# Patient Record
Sex: MALE | Race: BLACK OR AFRICAN AMERICAN | Employment: FULL TIME | URBAN - METROPOLITAN AREA
[De-identification: names, ages, dates, MRNs, and addresses within clinical notes are randomized per-mention and may not be internally consistent; named-entity substitution may affect disease eponyms.]

---

## 2019-10-01 ENCOUNTER — HOSPITAL ENCOUNTER (EMERGENCY)
Age: 24
Discharge: HOME | End: 2019-10-01
Attending: EMERGENCY MEDICINE
Payer: MEDICARE

## 2019-10-01 VITALS
BODY MASS INDEX: 29.66 KG/M2 | WEIGHT: 189 LBS | RESPIRATION RATE: 15 BRPM | HEART RATE: 77 BPM | TEMPERATURE: 98.4 F | DIASTOLIC BLOOD PRESSURE: 89 MMHG | OXYGEN SATURATION: 97 % | HEIGHT: 67 IN | SYSTOLIC BLOOD PRESSURE: 127 MMHG

## 2019-10-01 DIAGNOSIS — K52.9 GASTROENTERITIS: Primary | ICD-10-CM

## 2019-10-01 LAB
ABSOLUTE EOS #: 0.2 K/UL (ref 0–0.4)
ABSOLUTE IMMATURE GRANULOCYTE: ABNORMAL K/UL (ref 0–0.3)
ABSOLUTE LYMPH #: 1.3 K/UL (ref 1–4.8)
ABSOLUTE MONO #: 0.7 K/UL (ref 0.1–1.3)
ALBUMIN SERPL-MCNC: 4 G/DL (ref 3.5–5.2)
ALBUMIN/GLOBULIN RATIO: ABNORMAL (ref 1–2.5)
ALP BLD-CCNC: 57 U/L (ref 40–129)
ALT SERPL-CCNC: 24 U/L (ref 5–41)
ANION GAP SERPL CALCULATED.3IONS-SCNC: 13 MMOL/L (ref 9–17)
AST SERPL-CCNC: 21 U/L
BASOPHILS # BLD: 1 % (ref 0–2)
BASOPHILS ABSOLUTE: 0 K/UL (ref 0–0.2)
BILIRUB SERPL-MCNC: 0.83 MG/DL (ref 0.3–1.2)
BUN BLDV-MCNC: 4 MG/DL (ref 6–20)
BUN/CREAT BLD: ABNORMAL (ref 9–20)
CALCIUM SERPL-MCNC: 9.3 MG/DL (ref 8.6–10.4)
CHLORIDE BLD-SCNC: 105 MMOL/L (ref 98–107)
CO2: 23 MMOL/L (ref 20–31)
CREAT SERPL-MCNC: 0.96 MG/DL (ref 0.7–1.2)
DIFFERENTIAL TYPE: ABNORMAL
EOSINOPHILS RELATIVE PERCENT: 4 % (ref 0–4)
GFR AFRICAN AMERICAN: >60 ML/MIN
GFR NON-AFRICAN AMERICAN: >60 ML/MIN
GFR SERPL CREATININE-BSD FRML MDRD: ABNORMAL ML/MIN/{1.73_M2}
GFR SERPL CREATININE-BSD FRML MDRD: ABNORMAL ML/MIN/{1.73_M2}
GLUCOSE BLD-MCNC: 88 MG/DL (ref 70–99)
HCT VFR BLD CALC: 44.9 % (ref 41–53)
HEMOGLOBIN: 14.6 G/DL (ref 13.5–17.5)
IMMATURE GRANULOCYTES: ABNORMAL %
LIPASE: 17 U/L (ref 13–60)
LYMPHOCYTES # BLD: 27 % (ref 24–44)
MCH RBC QN AUTO: 29.4 PG (ref 26–34)
MCHC RBC AUTO-ENTMCNC: 32.6 G/DL (ref 31–37)
MCV RBC AUTO: 90.2 FL (ref 80–100)
MONOCYTES # BLD: 13 % (ref 1–7)
NRBC AUTOMATED: ABNORMAL PER 100 WBC
PDW BLD-RTO: 13 % (ref 11.5–14.9)
PLATELET # BLD: 256 K/UL (ref 150–450)
PLATELET ESTIMATE: ABNORMAL
PMV BLD AUTO: 8.4 FL (ref 6–12)
POTASSIUM SERPL-SCNC: 3.8 MMOL/L (ref 3.7–5.3)
RBC # BLD: 4.98 M/UL (ref 4.5–5.9)
RBC # BLD: ABNORMAL 10*6/UL
SEG NEUTROPHILS: 55 % (ref 36–66)
SEGMENTED NEUTROPHILS ABSOLUTE COUNT: 2.8 K/UL (ref 1.3–9.1)
SODIUM BLD-SCNC: 141 MMOL/L (ref 135–144)
TOTAL PROTEIN: 6.5 G/DL (ref 6.4–8.3)
WBC # BLD: 5 K/UL (ref 3.5–11)
WBC # BLD: ABNORMAL 10*3/UL

## 2019-10-01 PROCEDURE — 99284 EMERGENCY DEPT VISIT MOD MDM: CPT

## 2019-10-01 PROCEDURE — 6370000000 HC RX 637 (ALT 250 FOR IP): Performed by: EMERGENCY MEDICINE

## 2019-10-01 PROCEDURE — 83690 ASSAY OF LIPASE: CPT

## 2019-10-01 PROCEDURE — 6360000002 HC RX W HCPCS: Performed by: EMERGENCY MEDICINE

## 2019-10-01 PROCEDURE — 2500000003 HC RX 250 WO HCPCS: Performed by: EMERGENCY MEDICINE

## 2019-10-01 PROCEDURE — 2580000003 HC RX 258: Performed by: EMERGENCY MEDICINE

## 2019-10-01 PROCEDURE — 96375 TX/PRO/DX INJ NEW DRUG ADDON: CPT

## 2019-10-01 PROCEDURE — 96374 THER/PROPH/DIAG INJ IV PUSH: CPT

## 2019-10-01 PROCEDURE — 80053 COMPREHEN METABOLIC PANEL: CPT

## 2019-10-01 PROCEDURE — 36415 COLL VENOUS BLD VENIPUNCTURE: CPT

## 2019-10-01 PROCEDURE — 85025 COMPLETE CBC W/AUTO DIFF WBC: CPT

## 2019-10-01 RX ORDER — LIDOCAINE HYDROCHLORIDE 20 MG/ML
15 SOLUTION OROPHARYNGEAL ONCE
Status: COMPLETED | OUTPATIENT
Start: 2019-10-01 | End: 2019-10-01

## 2019-10-01 RX ORDER — MAGNESIUM HYDROXIDE/ALUMINUM HYDROXICE/SIMETHICONE 120; 1200; 1200 MG/30ML; MG/30ML; MG/30ML
15 SUSPENSION ORAL ONCE
Status: COMPLETED | OUTPATIENT
Start: 2019-10-01 | End: 2019-10-01

## 2019-10-01 RX ORDER — 0.9 % SODIUM CHLORIDE 0.9 %
1000 INTRAVENOUS SOLUTION INTRAVENOUS ONCE
Status: COMPLETED | OUTPATIENT
Start: 2019-10-01 | End: 2019-10-01

## 2019-10-01 RX ORDER — ONDANSETRON 2 MG/ML
4 INJECTION INTRAMUSCULAR; INTRAVENOUS ONCE
Status: COMPLETED | OUTPATIENT
Start: 2019-10-01 | End: 2019-10-01

## 2019-10-01 RX ORDER — ONDANSETRON 4 MG/1
4 TABLET, ORALLY DISINTEGRATING ORAL EVERY 8 HOURS PRN
Qty: 20 TABLET | Refills: 0 | Status: SHIPPED | OUTPATIENT
Start: 2019-10-01 | End: 2019-10-08

## 2019-10-01 RX ADMIN — ALUMINUM HYDROXIDE, MAGNESIUM HYDROXIDE, AND SIMETHICONE 15 ML: 200; 200; 20 SUSPENSION ORAL at 18:38

## 2019-10-01 RX ADMIN — FAMOTIDINE 20 MG: 10 INJECTION, SOLUTION INTRAVENOUS at 17:05

## 2019-10-01 RX ADMIN — SODIUM CHLORIDE 1000 ML: 9 INJECTION, SOLUTION INTRAVENOUS at 17:05

## 2019-10-01 RX ADMIN — ONDANSETRON 4 MG: 2 INJECTION INTRAMUSCULAR; INTRAVENOUS at 17:05

## 2019-10-01 RX ADMIN — LIDOCAINE HYDROCHLORIDE 15 ML: 20 SOLUTION ORAL; TOPICAL at 18:38

## 2019-10-01 ASSESSMENT — PAIN DESCRIPTION - PAIN TYPE: TYPE: ACUTE PAIN

## 2019-10-01 ASSESSMENT — PAIN SCALES - GENERAL: PAINLEVEL_OUTOF10: 10

## 2019-10-01 ASSESSMENT — PAIN DESCRIPTION - LOCATION: LOCATION: ABDOMEN

## 2019-10-03 ASSESSMENT — ENCOUNTER SYMPTOMS
VOMITING: 1
SORE THROAT: 0
SHORTNESS OF BREATH: 0
EYE PAIN: 0
NAUSEA: 1
DIARRHEA: 1
CONSTIPATION: 0
ABDOMINAL PAIN: 1
COLOR CHANGE: 0
COUGH: 0
BACK PAIN: 0

## 2020-03-06 ENCOUNTER — HOSPITAL ENCOUNTER (EMERGENCY)
Age: 25
Discharge: HOME | End: 2020-03-06
Attending: EMERGENCY MEDICINE
Payer: MEDICARE

## 2020-03-06 VITALS
WEIGHT: 189 LBS | OXYGEN SATURATION: 99 % | TEMPERATURE: 97.7 F | BODY MASS INDEX: 29.66 KG/M2 | SYSTOLIC BLOOD PRESSURE: 156 MMHG | RESPIRATION RATE: 17 BRPM | HEART RATE: 85 BPM | DIASTOLIC BLOOD PRESSURE: 69 MMHG | HEIGHT: 67 IN

## 2020-03-06 LAB
-: ABNORMAL
AMORPHOUS: ABNORMAL
BACTERIA: ABNORMAL
BILIRUBIN URINE: NEGATIVE
CASTS UA: ABNORMAL /LPF (ref 0–8)
COLOR: YELLOW
CRYSTALS, UA: ABNORMAL /HPF
EPITHELIAL CELLS UA: ABNORMAL /HPF (ref 0–5)
GLUCOSE URINE: NEGATIVE
KETONES, URINE: NEGATIVE
LEUKOCYTE ESTERASE, URINE: ABNORMAL
MUCUS: ABNORMAL
NITRITE, URINE: NEGATIVE
OTHER OBSERVATIONS UA: ABNORMAL
PH UA: 8 (ref 5–8)
PROTEIN UA: NEGATIVE
RBC UA: ABNORMAL /HPF (ref 0–4)
RENAL EPITHELIAL, UA: ABNORMAL /HPF
SPECIFIC GRAVITY UA: 1.02 (ref 1–1.03)
TRICHOMONAS: ABNORMAL
TURBIDITY: CLEAR
URINE HGB: NEGATIVE
UROBILINOGEN, URINE: NORMAL
WBC UA: ABNORMAL /HPF (ref 0–5)
YEAST: ABNORMAL

## 2020-03-06 PROCEDURE — 6360000002 HC RX W HCPCS: Performed by: NURSE PRACTITIONER

## 2020-03-06 PROCEDURE — 81001 URINALYSIS AUTO W/SCOPE: CPT

## 2020-03-06 PROCEDURE — 6370000000 HC RX 637 (ALT 250 FOR IP): Performed by: NURSE PRACTITIONER

## 2020-03-06 PROCEDURE — 87529 HSV DNA AMP PROBE: CPT

## 2020-03-06 PROCEDURE — 87491 CHLMYD TRACH DNA AMP PROBE: CPT

## 2020-03-06 PROCEDURE — 87591 N.GONORRHOEAE DNA AMP PROB: CPT

## 2020-03-06 PROCEDURE — 99283 EMERGENCY DEPT VISIT LOW MDM: CPT

## 2020-03-06 PROCEDURE — 96372 THER/PROPH/DIAG INJ SC/IM: CPT

## 2020-03-06 RX ORDER — AZITHROMYCIN 250 MG/1
1000 TABLET, FILM COATED ORAL ONCE
Status: COMPLETED | OUTPATIENT
Start: 2020-03-06 | End: 2020-03-06

## 2020-03-06 RX ORDER — ACYCLOVIR 200 MG/1
400 CAPSULE ORAL 3 TIMES DAILY
Qty: 60 CAPSULE | Refills: 0 | Status: SHIPPED | OUTPATIENT
Start: 2020-03-06 | End: 2020-03-16

## 2020-03-06 RX ORDER — CEFTRIAXONE SODIUM 250 MG/1
250 INJECTION, POWDER, FOR SOLUTION INTRAMUSCULAR; INTRAVENOUS ONCE
Status: COMPLETED | OUTPATIENT
Start: 2020-03-06 | End: 2020-03-06

## 2020-03-06 RX ORDER — ACYCLOVIR 400 MG/1
400 TABLET ORAL ONCE
Status: COMPLETED | OUTPATIENT
Start: 2020-03-06 | End: 2020-03-06

## 2020-03-06 RX ORDER — TRAZODONE HYDROCHLORIDE 50 MG/1
50 TABLET ORAL NIGHTLY
Status: ON HOLD | COMMUNITY
End: 2020-08-03 | Stop reason: HOSPADM

## 2020-03-06 RX ADMIN — ACYCLOVIR 400 MG: 400 TABLET ORAL at 14:56

## 2020-03-06 RX ADMIN — AZITHROMYCIN 1000 MG: 250 TABLET, FILM COATED ORAL at 13:23

## 2020-03-06 RX ADMIN — CEFTRIAXONE SODIUM 250 MG: 250 INJECTION, POWDER, FOR SOLUTION INTRAMUSCULAR; INTRAVENOUS at 13:23

## 2020-03-06 ASSESSMENT — ENCOUNTER SYMPTOMS
ANAL BLEEDING: 0
EYE PAIN: 0
NAUSEA: 0
ABDOMINAL DISTENTION: 0
VOICE CHANGE: 0
VOMITING: 0
CONSTIPATION: 0
RECTAL PAIN: 0
BACK PAIN: 0
SHORTNESS OF BREATH: 0
TROUBLE SWALLOWING: 0
FACIAL SWELLING: 0
CHEST TIGHTNESS: 0
DIARRHEA: 0
ABDOMINAL PAIN: 0

## 2020-03-06 ASSESSMENT — PAIN DESCRIPTION - PAIN TYPE: TYPE: ACUTE PAIN

## 2020-03-06 ASSESSMENT — PAIN DESCRIPTION - LOCATION: LOCATION: PENIS

## 2020-03-06 ASSESSMENT — PAIN SCALES - GENERAL: PAINLEVEL_OUTOF10: 7

## 2020-03-06 NOTE — ED NOTES
Pharmacy called and states they will send Acyclovir down momentarily.      Prema Palm RN  03/06/20 4371

## 2020-03-06 NOTE — ED PROVIDER NOTES
pain, frequency, hematuria, penile swelling, scrotal swelling, testicular pain and urgency. Musculoskeletal: Negative for arthralgias, back pain, myalgias, neck pain and neck stiffness. Skin: Negative for rash and wound. Neurological: Negative for headaches. Hematological: Negative for adenopathy. Psychiatric/Behavioral: Negative for confusion. PHYSICAL EXAM  (up to 7 for level 4, 8 or more for level 5)      INITIAL VITALS:  height is 5' 7\" (1.702 m) and weight is 189 lb (85.7 kg). His oral temperature is 97.7 °F (36.5 °C). His blood pressure is 156/69 (abnormal) and his pulse is 85. His respiration is 17 and oxygen saturation is 99%. Physical Exam  Vitals signs and nursing note reviewed. Exam conducted with a chaperone present. Constitutional:       General: He is not in acute distress. Appearance: Normal appearance. He is well-developed. He is not ill-appearing or diaphoretic. HENT:      Head: Normocephalic and atraumatic. Eyes:      Conjunctiva/sclera: Conjunctivae normal.   Neck:      Musculoskeletal: Full passive range of motion without pain and normal range of motion. Trachea: Trachea normal. No tracheal deviation. Cardiovascular:      Rate and Rhythm: Normal rate. Pulses: Normal pulses. Pulmonary:      Effort: Pulmonary effort is normal. No respiratory distress. Breath sounds: Normal breath sounds. Abdominal:      General: Abdomen is flat. Bowel sounds are normal. There is no distension. Palpations: Abdomen is soft. Abdomen is not rigid. There is no mass. Tenderness: There is no abdominal tenderness. There is no right CVA tenderness, left CVA tenderness, guarding or rebound. Negative signs include Moore's sign and McBurney's sign. Genitourinary:     Penis: Circumcised. Lesions present. No phimosis, paraphimosis, hypospadias, erythema, tenderness or discharge. Scrotum/Testes: Normal.         Right: Tenderness or swelling not present. Left: Tenderness or swelling not present. Comments: Multiple vesicles superior to the glans of the penis on the shaft. At this time they are all closed and there is no drainage. Tenderness with touch. No erythema or swelling. Musculoskeletal: Normal range of motion. Lymphadenopathy:      Lower Body: No right inguinal adenopathy. No left inguinal adenopathy. Skin:     General: Skin is warm and dry. Capillary Refill: Capillary refill takes less than 2 seconds. Findings: No bruising or ecchymosis. Neurological:      Mental Status: He is alert, oriented to person, place, and time and easily aroused. Sensory: No sensory deficit. Psychiatric:         Behavior: Behavior normal. Behavior is cooperative. Thought Content: Thought content normal.         Judgment: Judgment normal.       PLAN (LABS / IMAGING / EKG):  Orders Placed This Encounter   Procedures    C.trachomatis N.gonorrhoeae DNA, Urine    Urinalysis with microscopic    Herpes Simplex 1 & 2, Molecular       MEDICATIONS ORDERED:  Orders Placed This Encounter   Medications    azithromycin (ZITHROMAX) tablet 1,000 mg    cefTRIAXone (ROCEPHIN) injection 250 mg    acyclovir (ZOVIRAX) tablet 400 mg    acyclovir (ZOVIRAX) 200 MG capsule     Sig: Take 2 capsules by mouth 3 times daily for 10 days     Dispense:  60 capsule     Refill:  0       Controlled Substances Monitoring:       Differential Diagnoses:  Phimosis, paraphimosis, priapism, balanitis, posthitis, spermatocele, hydrocele, varicocele, epididymitis, orchitis, prostatitis, testicular torsion, testicular cancer, indirect inguinal hernia, alireza's gangrene, sexually transmitted infections. DIAGNOSTIC RESULTS / EMERGENCY DEPARTMENT COURSE / MDM     Patient here today with request for STD check. Patient states that he woke up this morning and noticed multiple little bumps on the shaft of his penis.   He denies any history of herpes and states he has never had anything like this in the past.  He states that he noted some because they were slightly painful. He denies any drainage or open wounds. He states he would like to be checked for STDs and treated at this time as well. We explained that we will not have his results for chlamydia and gonorrhea today but he can get those within the next 48 hours with my chart or medical records. Patient would like to be treated with azithromycin and Rocephin at this time for chlamydia and gonorrhea as well. On assessment the patient does have multiple vesicles on the shaft of his penis. A culture was obtained by opening 1 of the vesicles and obtaining fluid and sending this to lab. Patient given first dose of acyclovir here in the ER. Given prescription for this as well for the next 10 days and told to follow-up with primary care walk-in clinic. Patient was also instructed to avoid intercourse while the breakout is occurring. Told to take his prescription as indicated. He was also told to have any partners treated as well at this time. Please return to ER with any worsening of symptoms. RADIOLOGY:   I directly visualized (with the attending physician) the following  imagesand reviewed the radiologist interpretations:  No results found.     No orders to display       LABS:  Results for orders placed or performed during the hospital encounter of 03/06/20   Urinalysis with microscopic   Result Value Ref Range    Color, UA YELLOW YELLOW    Turbidity UA CLEAR CLEAR    Glucose, Ur NEGATIVE NEGATIVE    Bilirubin Urine NEGATIVE NEGATIVE    Ketones, Urine NEGATIVE NEGATIVE    Specific Gravity, UA 1.017 1.005 - 1.030    Urine Hgb NEGATIVE NEGATIVE    pH, UA 8.0 5.0 - 8.0    Protein, UA NEGATIVE NEGATIVE    Urobilinogen, Urine Normal Normal    Nitrite, Urine NEGATIVE NEGATIVE    Leukocyte Esterase, Urine TRACE (A) NEGATIVE    -          WBC, UA 10 TO 20 0 - 5 /HPF    RBC, UA None 0 - 4 /HPF    Casts UA  0 - 8 /LPF     0 TO 2 HYALINE

## 2020-03-07 LAB
HSV 1, NAAT: POSITIVE
HSV 2, NAAT: POSITIVE
SPECIMEN DESCRIPTION: ABNORMAL

## 2020-03-09 LAB
C. TRACHOMATIS DNA ,URINE: ABNORMAL
N. GONORRHOEAE DNA, URINE: NEGATIVE
SPECIMEN DESCRIPTION: ABNORMAL

## 2020-03-10 ENCOUNTER — TELEPHONE (OUTPATIENT)
Dept: PHARMACY | Age: 25
End: 2020-03-10

## 2020-05-25 ENCOUNTER — HOSPITAL ENCOUNTER (EMERGENCY)
Age: 25
Discharge: HOME | End: 2020-05-25
Attending: EMERGENCY MEDICINE
Payer: MEDICARE

## 2020-05-25 VITALS
HEIGHT: 67 IN | WEIGHT: 195 LBS | SYSTOLIC BLOOD PRESSURE: 136 MMHG | DIASTOLIC BLOOD PRESSURE: 83 MMHG | BODY MASS INDEX: 30.61 KG/M2 | TEMPERATURE: 98.1 F | OXYGEN SATURATION: 99 % | HEART RATE: 67 BPM | RESPIRATION RATE: 16 BRPM

## 2020-05-25 PROCEDURE — 99282 EMERGENCY DEPT VISIT SF MDM: CPT

## 2020-05-25 RX ORDER — ACYCLOVIR 400 MG/1
200 TABLET ORAL
Qty: 25 TABLET | Refills: 0 | Status: SHIPPED | OUTPATIENT
Start: 2020-05-25 | End: 2020-06-04

## 2020-05-25 ASSESSMENT — ENCOUNTER SYMPTOMS
GASTROINTESTINAL NEGATIVE: 1
RESPIRATORY NEGATIVE: 1

## 2020-05-25 ASSESSMENT — PAIN DESCRIPTION - FREQUENCY: FREQUENCY: CONTINUOUS

## 2020-05-25 ASSESSMENT — PAIN DESCRIPTION - DESCRIPTORS: DESCRIPTORS: SORE

## 2020-05-25 ASSESSMENT — PAIN DESCRIPTION - ORIENTATION: ORIENTATION: RIGHT;LEFT

## 2020-05-25 ASSESSMENT — PAIN DESCRIPTION - PAIN TYPE: TYPE: ACUTE PAIN

## 2020-05-25 ASSESSMENT — PAIN DESCRIPTION - LOCATION: LOCATION: GROIN

## 2020-05-25 ASSESSMENT — PAIN SCALES - GENERAL: PAINLEVEL_OUTOF10: 9

## 2020-05-25 NOTE — ED PROVIDER NOTES
RESULTS / EMERGENCY DEPARTMENT COURSE / [facility]     LABS:  No results found for this visit on 05/25/20. RADIOLOGY:  None    EKG  None    All EKG's are interpreted by the Emergency Department Physician who either signs or Co-signs this chart in the absence of a cardiologist.    EMERGENCY DEPARTMENT COURSE:  30 y.o. male who presents with HSV infx requesting antiviral.                  Patient was counseled to follow up with their Primary Care Provider as soon as possible for an ER follow-up visit. Patient counseled to return to the Emergency Department for any worsening symptoms, unresolved symptoms, or for any other cares or concerns. Patient counseled on the use of alternating Motrin and Tylenol should they develop a fever at home. Patient verbalized understanding and agreement with plan. Discharged to home in stable condition and in no distress. PROCEDURES:  None    CONSULTS:  None    CRITICAL CARE:  None    FINAL IMPRESSION      1. HSV (herpes simplex virus) infection              DISPOSITION / PLAN     DISPOSITION Decision To Discharge 05/25/2020 04:30:51 PM      PATIENT REFERRED TO:  No follow-up provider specified.     DISCHARGE MEDICATIONS:  New Prescriptions    ACYCLOVIR (ZOVIRAX) 400 MG TABLET    Take 0.5 tablets by mouth 5 times daily for 10 days       Martin Ordonez DO  Emergency Medicine Resident    (Please note that portions of thisnote were completed with a voice recognition program.  Efforts were made to edit the dictations but occasionally words are mis-transcribed.)       Martin Ordonez DO  Resident  05/25/20 8964

## 2020-07-29 ENCOUNTER — HOSPITAL ENCOUNTER (EMERGENCY)
Age: 25
Discharge: PSYCHIATRIC OR SUBSTANCE USE FACILITY | End: 2020-07-30
Attending: EMERGENCY MEDICINE
Payer: MEDICARE

## 2020-07-29 ENCOUNTER — APPOINTMENT (OUTPATIENT)
Dept: GENERAL RADIOLOGY | Age: 25
End: 2020-07-29
Payer: MEDICARE

## 2020-07-29 VITALS
BODY MASS INDEX: 30.29 KG/M2 | TEMPERATURE: 97.5 F | SYSTOLIC BLOOD PRESSURE: 150 MMHG | RESPIRATION RATE: 18 BRPM | WEIGHT: 193 LBS | HEIGHT: 67 IN | DIASTOLIC BLOOD PRESSURE: 96 MMHG | OXYGEN SATURATION: 98 % | HEART RATE: 77 BPM

## 2020-07-29 LAB
-: NORMAL
ABSOLUTE EOS #: 0.05 K/UL (ref 0–0.44)
ABSOLUTE IMMATURE GRANULOCYTE: 0.03 K/UL (ref 0–0.3)
ABSOLUTE LYMPH #: 1.41 K/UL (ref 1.1–3.7)
ABSOLUTE MONO #: 0.39 K/UL (ref 0.1–1.2)
ACETAMINOPHEN LEVEL: <5 UG/ML (ref 10–30)
ALBUMIN SERPL-MCNC: 4.1 G/DL (ref 3.5–5.2)
ALBUMIN/GLOBULIN RATIO: 2 (ref 1–2.5)
ALP BLD-CCNC: 44 U/L (ref 40–129)
ALT SERPL-CCNC: 30 U/L (ref 5–41)
AMORPHOUS: NORMAL
AMPHETAMINE SCREEN URINE: NEGATIVE
ANION GAP SERPL CALCULATED.3IONS-SCNC: 13 MMOL/L (ref 9–17)
AST SERPL-CCNC: 27 U/L
BACTERIA: NORMAL
BARBITURATE SCREEN URINE: NEGATIVE
BASOPHILS # BLD: 1 % (ref 0–2)
BASOPHILS ABSOLUTE: 0.03 K/UL (ref 0–0.2)
BENZODIAZEPINE SCREEN, URINE: NEGATIVE
BILIRUB SERPL-MCNC: 0.72 MG/DL (ref 0.3–1.2)
BILIRUBIN DIRECT: 0.18 MG/DL
BILIRUBIN URINE: NEGATIVE
BILIRUBIN, INDIRECT: 0.54 MG/DL (ref 0–1)
BUN BLDV-MCNC: 8 MG/DL (ref 6–20)
BUN/CREAT BLD: NORMAL (ref 9–20)
BUPRENORPHINE URINE: ABNORMAL
CALCIUM SERPL-MCNC: 9.2 MG/DL (ref 8.6–10.4)
CANNABINOID SCREEN URINE: POSITIVE
CASTS UA: NORMAL /LPF (ref 0–8)
CHLORIDE BLD-SCNC: 104 MMOL/L (ref 98–107)
CO2: 22 MMOL/L (ref 20–31)
COCAINE METABOLITE, URINE: NEGATIVE
COLOR: YELLOW
COMMENT UA: ABNORMAL
CREAT SERPL-MCNC: 0.93 MG/DL (ref 0.7–1.2)
CRYSTALS, UA: NORMAL /HPF
DIFFERENTIAL TYPE: ABNORMAL
EOSINOPHILS RELATIVE PERCENT: 1 % (ref 1–4)
EPITHELIAL CELLS UA: NORMAL /HPF (ref 0–5)
ETHANOL PERCENT: <0.01 %
ETHANOL: <10 MG/DL
GFR AFRICAN AMERICAN: >60 ML/MIN
GFR NON-AFRICAN AMERICAN: >60 ML/MIN
GFR SERPL CREATININE-BSD FRML MDRD: NORMAL ML/MIN/{1.73_M2}
GFR SERPL CREATININE-BSD FRML MDRD: NORMAL ML/MIN/{1.73_M2}
GLOBULIN: ABNORMAL G/DL (ref 1.5–3.8)
GLUCOSE BLD-MCNC: 87 MG/DL (ref 70–99)
GLUCOSE URINE: NEGATIVE
HCT VFR BLD CALC: 44.7 % (ref 40.7–50.3)
HEMOGLOBIN: 14.4 G/DL (ref 13–17)
IMMATURE GRANULOCYTES: 1 %
KETONES, URINE: NEGATIVE
LACTIC ACID, WHOLE BLOOD: 1.7 MMOL/L (ref 0.7–2.1)
LEUKOCYTE ESTERASE, URINE: ABNORMAL
LIPASE: 20 U/L (ref 13–60)
LYMPHOCYTES # BLD: 26 % (ref 24–43)
MCH RBC QN AUTO: 30.2 PG (ref 25.2–33.5)
MCHC RBC AUTO-ENTMCNC: 32.2 G/DL (ref 28.4–34.8)
MCV RBC AUTO: 93.7 FL (ref 82.6–102.9)
MDMA URINE: ABNORMAL
METHADONE SCREEN, URINE: NEGATIVE
METHAMPHETAMINE, URINE: ABNORMAL
MONOCYTES # BLD: 7 % (ref 3–12)
MUCUS: NORMAL
NITRITE, URINE: NEGATIVE
NRBC AUTOMATED: 0 PER 100 WBC
OPIATES, URINE: NEGATIVE
OTHER OBSERVATIONS UA: NORMAL
OXYCODONE SCREEN URINE: NEGATIVE
PDW BLD-RTO: 12.9 % (ref 11.8–14.4)
PH UA: >9 (ref 5–8)
PHENCYCLIDINE, URINE: NEGATIVE
PLATELET # BLD: 259 K/UL (ref 138–453)
PLATELET ESTIMATE: ABNORMAL
PMV BLD AUTO: 9.9 FL (ref 8.1–13.5)
POTASSIUM SERPL-SCNC: 4.1 MMOL/L (ref 3.7–5.3)
PROPOXYPHENE, URINE: ABNORMAL
PROTEIN UA: NEGATIVE
RBC # BLD: 4.77 M/UL (ref 4.21–5.77)
RBC # BLD: ABNORMAL 10*6/UL
RBC UA: NORMAL /HPF (ref 0–4)
RENAL EPITHELIAL, UA: NORMAL /HPF
SALICYLATE LEVEL: <1 MG/DL (ref 3–10)
SEG NEUTROPHILS: 64 % (ref 36–65)
SEGMENTED NEUTROPHILS ABSOLUTE COUNT: 3.57 K/UL (ref 1.5–8.1)
SODIUM BLD-SCNC: 139 MMOL/L (ref 135–144)
SPECIFIC GRAVITY UA: 1.02 (ref 1–1.03)
TEST INFORMATION: ABNORMAL
TOTAL PROTEIN: 6.2 G/DL (ref 6.4–8.3)
TOXIC TRICYCLIC SC,BLOOD: NEGATIVE
TRICHOMONAS: NORMAL
TRICYCLIC ANTIDEPRESSANTS, UR: ABNORMAL
TURBIDITY: CLEAR
URINE HGB: NEGATIVE
UROBILINOGEN, URINE: NORMAL
WBC # BLD: 5.5 K/UL (ref 3.5–11.3)
WBC # BLD: ABNORMAL 10*3/UL
WBC UA: NORMAL /HPF (ref 0–5)
YEAST: NORMAL

## 2020-07-29 PROCEDURE — 83690 ASSAY OF LIPASE: CPT

## 2020-07-29 PROCEDURE — 99285 EMERGENCY DEPT VISIT HI MDM: CPT

## 2020-07-29 PROCEDURE — 80307 DRUG TEST PRSMV CHEM ANLYZR: CPT

## 2020-07-29 PROCEDURE — 80076 HEPATIC FUNCTION PANEL: CPT

## 2020-07-29 PROCEDURE — 81001 URINALYSIS AUTO W/SCOPE: CPT

## 2020-07-29 PROCEDURE — 93005 ELECTROCARDIOGRAM TRACING: CPT | Performed by: STUDENT IN AN ORGANIZED HEALTH CARE EDUCATION/TRAINING PROGRAM

## 2020-07-29 PROCEDURE — 83605 ASSAY OF LACTIC ACID: CPT

## 2020-07-29 PROCEDURE — G0480 DRUG TEST DEF 1-7 CLASSES: HCPCS

## 2020-07-29 PROCEDURE — 6370000000 HC RX 637 (ALT 250 FOR IP): Performed by: STUDENT IN AN ORGANIZED HEALTH CARE EDUCATION/TRAINING PROGRAM

## 2020-07-29 PROCEDURE — 80048 BASIC METABOLIC PNL TOTAL CA: CPT

## 2020-07-29 PROCEDURE — 71045 X-RAY EXAM CHEST 1 VIEW: CPT

## 2020-07-29 PROCEDURE — 85025 COMPLETE CBC W/AUTO DIFF WBC: CPT

## 2020-07-29 RX ORDER — FAMOTIDINE 20 MG/1
20 TABLET, FILM COATED ORAL ONCE
Status: COMPLETED | OUTPATIENT
Start: 2020-07-29 | End: 2020-07-29

## 2020-07-29 RX ADMIN — FAMOTIDINE 20 MG: 20 TABLET, FILM COATED ORAL at 17:19

## 2020-07-29 ASSESSMENT — PAIN DESCRIPTION - ORIENTATION: ORIENTATION: MID;LOWER;UPPER

## 2020-07-29 ASSESSMENT — PAIN DESCRIPTION - LOCATION: LOCATION: ABDOMEN

## 2020-07-29 ASSESSMENT — PAIN DESCRIPTION - PAIN TYPE: TYPE: ACUTE PAIN

## 2020-07-29 ASSESSMENT — PAIN DESCRIPTION - PROGRESSION: CLINICAL_PROGRESSION: NOT CHANGED

## 2020-07-29 ASSESSMENT — PAIN DESCRIPTION - DESCRIPTORS: DESCRIPTORS: ACHING;PRESSURE;SHARP;DISCOMFORT

## 2020-07-29 ASSESSMENT — PAIN DESCRIPTION - FREQUENCY: FREQUENCY: CONTINUOUS

## 2020-07-29 ASSESSMENT — PAIN SCALES - GENERAL: PAINLEVEL_OUTOF10: 10

## 2020-07-29 NOTE — ED NOTES
Dr Lester Padilla accepted patient to [facility], dx schizoaffective disorder. Dr Lester Padilla requested urine drug screen, writer to inform RN. Writer to coordinate transfer & arrange transport.       JORGE Whitlock, [place]  07/29/20 8890

## 2020-07-29 NOTE — ED PROVIDER NOTES
[address]  ED  Emergency Department Encounter  Emergency Medicine Resident     Pt Name: Bertha Sheikh  MRN: [id number]  [place] 1995  Date of evaluation: 7/29/20  PCP:  No primary care provider on file. CHIEF COMPLAINT       Chief Complaint   Patient presents with    Suicidal     Suicidal thoughts & Homicidal thoughts. Has Voices in head        HISTORY OFPRESENT ILLNESS  (Location/Symptom, Timing/Onset, Context/Setting, Quality, Duration, Modifying Factors,Severity.)      Bertha Sheikh is a 30 y.o. male who presents with suicidal ideation and thoughts. Patient states he is hearing voices in his head telling him to hurt others or hurt himself. Patient states that he has felt like this before and was admitted to inpatient facility where he was placed on medications. Patient states he given medications to sleep, trazodone however he was also prescribed Risperdal but he does not like to take this as it sedates him. Patient is an inadequate historian, he does not remember where he slept last night he states he keeps blacking out, he does admit to intermittent alcohol use. Patient states he has abdominal pain. Patient denies any fever, chills, nausea, vomiting, chest pain, shortness of breath. Patient states he used to live with family, however he stays with people now, he does not have any specifics regarding questions. Patient states he has heard the diagnosis of schizophrenia previously. PAST MEDICAL / SURGICAL / SOCIAL / FAMILY HISTORY      has no past medical history on file. has no past surgical history on file.      Social History     Socioeconomic History    Marital status: Single     Spouse name: Not on file    Number of children: Not on file    Years of education: Not on file    Highest education level: Not on file   Occupational History    Not on file   Social Needs    Financial resource strain: Not on file    Food insecurity     Worry: Not on file Inability: Not on file    Transportation needs     Medical: Not on file     Non-medical: Not on file   Tobacco Use    Smoking status: Former Smoker    Smokeless tobacco: Never Used   Substance and Sexual Activity    Alcohol use: Not Currently    Drug use: Yes     Types: Marijuana     Comment: occasional marijuana use    Sexual activity: Yes     Partners: Female   Lifestyle    Physical activity     Days per week: Not on file     Minutes per session: Not on file    Stress: Not on file   Relationships    Social connections     Talks on phone: Not on file     Gets together: Not on file     Attends Rastafari service: Not on file     Active member of club or organization: Not on file     Attends meetings of clubs or organizations: Not on file     Relationship status: Not on file    Intimate partner violence     Fear of current or ex partner: Not on file     Emotionally abused: Not on file     Physically abused: Not on file     Forced sexual activity: Not on file   Other Topics Concern    Not on file   Social History Narrative    Not on file       No family history on file. Allergies:  Patient has no known allergies. Home Medications:  Prior to Admission medications    Medication Sig Start Date End Date Taking?  Authorizing Provider   traZODone (DESYREL) 50 MG tablet Take 50 mg by mouth nightly    Historical Provider, MD       REVIEW OFSYSTEMS    (2-9 systems for level 4, 10 or more for level 5)      Constitutional ROS - No recent fevers, No recent chills, +SI, HI   Neurological ROS - No Headache, No Syncope  Opthalmologic ROS- No eye pain, No vision changes   ENT ROS - No sore throat, No congestion  Respiratory ROS - No cough, No shortness of breath  Cardiovascular ROS - No chest pain, No palpitations   Gastrointestinal ROS - No abdominal pain, No nausea, No vomiting  Genito-Urinary ROS - No dysuria, Nohematuria  Musculoskeletal ROS - No back pain, No neck pain  Dermatological ROS - No wound, No rash  PHYSICAL EXAM   (up to 7 for level 4, 8 or more forlevel 5)      INITIAL VITALS:   ED Triage Vitals [07/29/20 1407]   BP Temp Temp Source Pulse Resp SpO2 Height Weight   (!) 150/96 97.5 °F (36.4 °C) Oral 77 18 98 % 5' 7\" (1.702 m) 193 lb (87.5 kg)       Physical Exam  HENT:      Head: Normocephalic and atraumatic. Eyes:      Pupils: Pupils are equal, round, and reactive to light. Neck:      Musculoskeletal: Normal range of motion and neck supple. Cardiovascular:      Rate and Rhythm: Normal rate and regular rhythm. Pulmonary:      Effort: Pulmonary effort is normal. No respiratory distress. Breath sounds: Normal breath sounds. No stridor. Abdominal:      General: Bowel sounds are normal. There is no distension. Palpations: Abdomen is soft. Tenderness: There is no abdominal tenderness. Musculoskeletal: Normal range of motion. General: No deformity. Skin:     General: Skin is warm and dry. Capillary Refill: Capillary refill takes less than 2 seconds. Neurological:      Mental Status: He is alert and oriented to person, place, and time. Psychiatric:         Behavior: Behavior normal.         DIFFERENTIAL  DIAGNOSIS     PLAN (LABS / IMAGING / EKG):  Orders Placed This Encounter   Procedures    XR CHEST PORTABLE    CBC Auto Differential    Basic Metabolic Panel    LIPASE    Urine Drug Screen    Urinalysis Reflex to Culture    Lactic Acid, Whole Blood    Hepatic Function Panel    TOX SCR, BLD, ED    Microscopic Urinalysis    Inpatient consult to Social Work    EKG 12 Lead       MEDICATIONS ORDERED:  Orders Placed This Encounter   Medications    famotidine (PEPCID) tablet 20 mg       DDX: Suicidal ideation, homicidal ideation, toxic substance abuse, substance abuse, toxic ingestion, pancreatitis, intra-abdominal pathology, schizophrenia, undiagnosed Polar disorder,    Initial MDM/Plan: 30 y.o. male who presents with suicidal and homicidal ideation.   Patient All other components within normal limits   TOX SCR, BLD, ED - Abnormal; Notable for the following components:    Acetaminophen Level <5 (*)     Salicylate Lvl <1 (*)     All other components within normal limits   BASIC METABOLIC PANEL   LIPASE   LACTIC ACID, WHOLE BLOOD   MICROSCOPIC URINALYSIS         RADIOLOGY:  Xr Chest Portable    Result Date: 7/29/2020  EXAMINATION: ONE XRAY VIEW OF THE CHEST 7/29/2020 3:09 pm COMPARISON: Chest x-ray, 03/19/2011 HISTORY: ORDERING SYSTEM PROVIDED HISTORY: abdominal brionna TECHNOLOGIST PROVIDED HISTORY: abdominal brionna FINDINGS: The cardiomediastinal contours are unremarkable given the portable AP technique. The lungs are clear bilaterally. There is no evidence of focal consolidation, pulmonary edema, pleural effusion or pneumothorax. Stable chest with no acute cardiopulmonary process. EKG  EKG Interpretation    Interpreted by me    Rhythm: normal sinus   Rate: normal  Axis: normal  Ectopy: none  Conduction: normal  ST Segments: no acute change  T Waves: no acute change  Q Waves: none    Clinical Impression: no acute changes and normal EKG    All EKG's are interpreted by the Emergency Department Physicianwho either signs or Co-signs this chart in the absence of a cardiologist.      EMERGENCY DEPARTMENT COURSE:  ED Course as of Jul 29 1930 Wed Jul 29, 2020   1712 Patient is reporting acid reflux, will prescribe patient Pepcid. [GABINO]      ED Course User Index  [GABINO] Pushpa Lindsay DO           PROCEDURES:  None    CONSULTS:  IP CONSULT TO SOCIAL WORK    CRITICAL CARE:  Please see attending note    FINAL IMPRESSION      1. Suicidal ideation          DISPOSITION / PLAN     DISPOSITION        PATIENT REFERRED TO:  No follow-up provider specified.     DISCHARGE MEDICATIONS:  New Prescriptions    No medications on file       Niant[place] Signs, DO  Emergency Medicine Resident    (Please note that portions of this note were completed with a voice recognition program.Efforts were made to edit the dictations but occasionally words are mis-transcribed.)       Chris Perdomo DO  Resident  07/31/20 2012

## 2020-07-29 NOTE — ED NOTES
Pt provided lunch box. Resting in bed. NAD at this time. Even non labored RR. Will continue to monitor.         Christen Centeno RN  07/29/20 9348

## 2020-07-29 NOTE — ED NOTES
Pt complaining of acid reflux. Notified Dr. Alma Delia Lord. NAD at this time. Even non labored RR. Will continue to monitor.         Mali Nevarez RN  07/29/20 8224

## 2020-07-29 NOTE — ED PROVIDER NOTES
[address] ED  Emergency Department  Faculty Sign-Out Addendum     Care of Karin Salmon was assumed from previous attending and is being seen for Suicidal (Suicidal thoughts & Homicidal thoughts. Has Voices in head )  . The patient's initial evaluation and plan have been discussed with the prior provider who initially evaluated the patient. Attestation    I was available and discussed any additional care issues that arose and coordinated the management plans with the resident(s) caring for the patient during my duty period. Any areas of disagreement with residents documentation of care or procedures are noted on the chart. I was personally present for the key portions of any/all procedures during my duty period. I have documented in the chart those procedures where I was not present during the key portions. EMERGENCY DEPARTMENT COURSE / MEDICAL DECISION MAKING:       MEDICATIONS GIVEN:  Orders Placed This Encounter   Medications    famotidine (PEPCID) tablet 20 mg       LABS / RADIOLOGY:     Labs Reviewed   CBC WITH AUTO DIFFERENTIAL - Abnormal; Notable for the following components:       Result Value    Immature Granulocytes 1 (*)     All other components within normal limits   HEPATIC FUNCTION PANEL - Abnormal; Notable for the following components:     Total Protein 6.2 (*)     All other components within normal limits   TOX SCR, BLD, ED - Abnormal; Notable for the following components:    Acetaminophen Level <5 (*)     Salicylate Lvl <1 (*)     All other components within normal limits   BASIC METABOLIC PANEL   LIPASE   LACTIC ACID, WHOLE BLOOD   URINE DRUG SCREEN   URINE RT REFLEX TO CULTURE       Xr Chest Portable    Result Date: 7/29/2020  EXAMINATION: ONE XRAY VIEW OF THE CHEST 7/29/2020 3:09 pm COMPARISON: Chest x-ray, 03/19/2011 HISTORY: ORDERING SYSTEM PROVIDED HISTORY: abdominal brionna TECHNOLOGIST PROVIDED HISTORY: abdominal brionna FINDINGS: The cardiomediastinal contours are unremarkable given the portable AP technique. The lungs are clear bilaterally. There is no evidence of focal consolidation, pulmonary edema, pleural effusion or pneumothorax. Stable chest with no acute cardiopulmonary process. RECENT VITALS:     Temp: 97.5 °F (36.4 °C),  Pulse: 77, Resp: 18, BP: (!) 150/96, SpO2: 98 %    Joe Panchal is a 30 y.o. male who presents with complaint of suicidal and homicidal ideation. Medically stable for psychiatric evaluation. Awaiting transfer. OUTSTANDING TASKS / RECOMMENDATIONS:    1.  Disposition made by previous attending and nothing to do      Treesita Cottrell MD, [facility]  Attending Emergency Physician  [facility] ED       Johnny Black MD  07/29/20 3532

## 2020-07-29 NOTE — ED PROVIDER NOTES
[facility] ED  eMERGENCY dEPARTMENT eNCOUnter   Attending Attestation     Pt Name: Hu Covarrubias  MRN: [id number]  [place] 1995  Date of evaluation: 7/29/20       Hu Covarrubias is a 30 y.o. male who presents with Suicidal (Suicidal thoughts & Homicidal thoughts. Has Voices in head )      History: Patient with suicidal and homicidal thoughts with auditory hallucinations as well as epigastric pain. Patient has no other complaints. Exam: Heart rate rhythm regular. Lungs are clear to auscultation bilaterally. Abdomen soft, nontender. Patient is well-appearing. Patient endorses homicidal and suicidal thoughts. And for social work involvement and probable transfer to [facility]. I performed a history and physical examination of the patient and discussed management with the resident. I reviewed the residents note and agree with the documented findings and plan of care. Any areas of disagreement are noted on the chart. I was personally present for the key portions of any procedures. I have documented in the chart those procedures where I was not present during the key portions. I have personally reviewed all images and agree with the resident's interpretation. I have reviewed the emergency nurses triage note. I agree with the chief complaint, past medical history, past surgical history, allergies, medications, social and family history as documented unless otherwise noted below. Documentation of the HPI, Physical Exam and Medical Decision Making performed by medical students or scribes is based on my personal performance of the HPI, PE and MDM. For Phys Assistant/ Nurse Practitioner cases/documentation I have had a face to face evaluation of this patient and have completed at least one if not all key elements of the E/M (history, physical exam, and MDM). Additional findings are as noted.     For APC cases I have personally evaluated and examined the patient in conjunction with the [facility] and agree with the treatment plan and disposition of the patient as recorded by the APC.     Luigi Katz MD  Attending Emergency  Physician        Sanjana Khalil MD  07/29/20 2829

## 2020-07-30 ENCOUNTER — HOSPITAL ENCOUNTER (INPATIENT)
Age: 25
LOS: 4 days | Discharge: HOME | DRG: 750 | End: 2020-08-03
Attending: PSYCHIATRY & NEUROLOGY | Admitting: PSYCHIATRY & NEUROLOGY
Payer: MEDICARE

## 2020-07-30 PROBLEM — F20.0 PARANOID SCHIZOPHRENIA (HCC): Status: ACTIVE | Noted: 2020-07-30

## 2020-07-30 LAB
EKG ATRIAL RATE: 78 BPM
EKG P AXIS: 67 DEGREES
EKG P-R INTERVAL: 144 MS
EKG Q-T INTERVAL: 376 MS
EKG QRS DURATION: 86 MS
EKG QTC CALCULATION (BAZETT): 428 MS
EKG R AXIS: 41 DEGREES
EKG T AXIS: 37 DEGREES
EKG VENTRICULAR RATE: 78 BPM
SARS-COV-2, PCR: NORMAL
SARS-COV-2, RAPID: NORMAL
SARS-COV-2: NOT DETECTED
SOURCE: NORMAL

## 2020-07-30 PROCEDURE — 6370000000 HC RX 637 (ALT 250 FOR IP): Performed by: REGISTERED NURSE

## 2020-07-30 PROCEDURE — 1240000000 HC EMOTIONAL WELLNESS R&B

## 2020-07-30 PROCEDURE — 93010 ELECTROCARDIOGRAM REPORT: CPT | Performed by: INTERNAL MEDICINE

## 2020-07-30 PROCEDURE — 90792 PSYCH DIAG EVAL W/MED SRVCS: CPT | Performed by: REGISTERED NURSE

## 2020-07-30 PROCEDURE — U0003 INFECTIOUS AGENT DETECTION BY NUCLEIC ACID (DNA OR RNA); SEVERE ACUTE RESPIRATORY SYNDROME CORONAVIRUS 2 (SARS-COV-2) (CORONAVIRUS DISEASE [COVID-19]), AMPLIFIED PROBE TECHNIQUE, MAKING USE OF HIGH THROUGHPUT TECHNOLOGIES AS DESCRIBED BY CMS-2020-01-R: HCPCS

## 2020-07-30 RX ORDER — QUETIAPINE FUMARATE 100 MG/1
100 TABLET, FILM COATED ORAL NIGHTLY
Status: DISCONTINUED | OUTPATIENT
Start: 2020-07-30 | End: 2020-07-31

## 2020-07-30 RX ORDER — ACETAMINOPHEN 325 MG/1
650 TABLET ORAL EVERY 4 HOURS PRN
Status: DISCONTINUED | OUTPATIENT
Start: 2020-07-30 | End: 2020-08-03 | Stop reason: HOSPADM

## 2020-07-30 RX ORDER — HYDROXYZINE HYDROCHLORIDE 25 MG/1
25 TABLET, FILM COATED ORAL 3 TIMES DAILY PRN
Status: DISCONTINUED | OUTPATIENT
Start: 2020-07-30 | End: 2020-08-03 | Stop reason: HOSPADM

## 2020-07-30 RX ORDER — MAGNESIUM HYDROXIDE/ALUMINUM HYDROXICE/SIMETHICONE 120; 1200; 1200 MG/30ML; MG/30ML; MG/30ML
30 SUSPENSION ORAL EVERY 6 HOURS PRN
Status: DISCONTINUED | OUTPATIENT
Start: 2020-07-30 | End: 2020-08-03 | Stop reason: HOSPADM

## 2020-07-30 RX ORDER — TRAZODONE HYDROCHLORIDE 50 MG/1
50 TABLET ORAL NIGHTLY PRN
Status: DISCONTINUED | OUTPATIENT
Start: 2020-07-30 | End: 2020-08-03 | Stop reason: HOSPADM

## 2020-07-30 RX ADMIN — HYDROXYZINE HYDROCHLORIDE 25 MG: 25 TABLET, FILM COATED ORAL at 11:20

## 2020-07-30 RX ADMIN — QUETIAPINE FUMARATE 100 MG: 100 TABLET ORAL at 20:32

## 2020-07-30 ASSESSMENT — PAIN SCALES - GENERAL: PAINLEVEL_OUTOF10: 0

## 2020-07-30 ASSESSMENT — SLEEP AND FATIGUE QUESTIONNAIRES
DO YOU HAVE DIFFICULTY SLEEPING: NO
DO YOU USE A SLEEP AID: NO
AVERAGE NUMBER OF SLEEP HOURS: 6

## 2020-07-30 ASSESSMENT — LIFESTYLE VARIABLES: HISTORY_ALCOHOL_USE: NO

## 2020-07-30 NOTE — BH NOTE
level: Not on file   Occupational History    Not on file   Social Needs    Financial resource strain: Not on file    Food insecurity     Worry: Not on file     Inability: Not on file    Transportation needs     Medical: Not on file     Non-medical: Not on file   Tobacco Use    Smoking status: Former Smoker    Smokeless tobacco: Never Used   Substance and Sexual Activity    Alcohol use: Not Currently    Drug use: Yes     Types: Marijuana     Comment: occasional marijuana use    Sexual activity: Yes     Partners: Female   Lifestyle    Physical activity     Days per week: Not on file     Minutes per session: Not on file    Stress: Not on file   Relationships    Social connections     Talks on phone: Not on file     Gets together: Not on file     Attends Mandaen service: Not on file     Active member of club or organization: Not on file     Attends meetings of clubs or organizations: Not on file     Relationship status: Not on file    Intimate partner violence     Fear of current or ex partner: Not on file     Emotionally abused: Not on file     Physically abused: Not on file     Forced sexual activity: Not on file   Other Topics Concern    Not on file   Social History Narrative    Not on file         Mental Status  Pt. was alert, fully oriented, and cooperative. Appearance and hygiene weredisheveled, poor hygiene . Mood was depressed. Affect was \"dysthymic and poorly reactive Thought process was demonstrative of poverty of thought and thought blocking. Patient endorsed auditory hallucinations including command hallucinations. Patient denied suicidal ideations. Patient denied homicidal ideations . Patient's gross cognitive functions were impaired. Insight and judgement were poor. Both recent and remote memory were impaired.     Psychomotor status was slowed     Labs  Recent Results (from the past 72 hour(s))   EKG 12 Lead    Collection Time: 07/29/20  2:49 PM   Result Value Ref Range    Ventricular Rate 78 BPM    Atrial Rate 78 BPM    P-R Interval 144 ms    QRS Duration 86 ms    Q-T Interval 376 ms    QTc Calculation (Bazett) 428 ms    P Axis 67 degrees    R Axis 41 degrees    T Axis 37 degrees   CBC Auto Differential    Collection Time: 07/29/20  3:30 PM   Result Value Ref Range    WBC 5.5 3.5 - 11.3 k/uL    RBC 4.77 4.21 - 5.77 m/uL    Hemoglobin 14.4 13.0 - 17.0 g/dL    Hematocrit 44.7 40.7 - 50.3 %    MCV 93.7 82.6 - 102.9 fL    MCH 30.2 25.2 - 33.5 pg    MCHC 32.2 28.4 - 34.8 g/dL    RDW 12.9 11.8 - 14.4 %    Platelets 715 519 - 669 k/uL    MPV 9.9 8.1 - 13.5 fL    NRBC Automated 0.0 0.0 per 100 WBC    Differential Type NOT REPORTED     Seg Neutrophils 64 36 - 65 %    Lymphocytes 26 24 - 43 %    Monocytes 7 3 - 12 %    Eosinophils % 1 1 - 4 %    Basophils 1 0 - 2 %    Immature Granulocytes 1 (H) 0 %    Segs Absolute 3.57 1.50 - 8.10 k/uL    Absolute Lymph # 1.41 1.10 - 3.70 k/uL    Absolute Mono # 0.39 0.10 - 1.20 k/uL    Absolute Eos # 0.05 0.00 - 0.44 k/uL    Basophils Absolute 0.03 0.00 - 0.20 k/uL    Absolute Immature Granulocyte 0.03 0.00 - 0.30 k/uL    WBC Morphology NOT REPORTED     RBC Morphology NOT REPORTED     Platelet Estimate NOT REPORTED    Basic Metabolic Panel    Collection Time: 07/29/20  3:30 PM   Result Value Ref Range    Glucose 87 70 - 99 mg/dL    BUN 8 6 - 20 mg/dL    CREATININE 0.93 0.70 - 1.20 mg/dL    Bun/Cre Ratio NOT REPORTED 9 - 20    Calcium 9.2 8.6 - 10.4 mg/dL    Sodium 139 135 - 144 mmol/L    Potassium 4.1 3.7 - 5.3 mmol/L    Chloride 104 98 - 107 mmol/L    CO2 22 20 - 31 mmol/L    Anion Gap 13 9 - 17 mmol/L    GFR Non-African American >60 >60 mL/min    GFR African American >60 >60 mL/min    GFR Comment          GFR Staging NOT REPORTED    LIPASE    Collection Time: 07/29/20  3:30 PM   Result Value Ref Range    Lipase 20 13 - 60 U/L   Lactic Acid, Whole Blood    Collection Time: 07/29/20  3:30 PM   Result Value Ref Range    Lactic Acid, Whole Blood 1.7 0.7 - 2.1 mmol/L 1.030    Urine Hgb NEGATIVE NEGATIVE    pH, UA >9.0 (H) 5.0 - 8.0    Protein, UA NEGATIVE NEGATIVE    Urobilinogen, Urine Normal Normal    Nitrite, Urine NEGATIVE NEGATIVE    Leukocyte Esterase, Urine TRACE (A) NEGATIVE    Urinalysis Comments NOT REPORTED    Microscopic Urinalysis    Collection Time: 07/29/20  6:55 PM   Result Value Ref Range    -          WBC, UA 2 TO 5 0 - 5 /HPF    RBC, UA None 0 - 4 /HPF    Casts UA NOT REPORTED 0 - 8 /LPF    Crystals, UA NOT REPORTED None /HPF    Epithelial Cells UA None 0 - 5 /HPF    Renal Epithelial, UA NOT REPORTED 0 /HPF    Bacteria, UA NOT REPORTED None    Mucus, UA NOT REPORTED None    Trichomonas, UA NOT REPORTED None    Amorphous, UA NOT REPORTED None    Other Observations UA NOT REPORTED NOT REQ. Yeast, UA NOT REPORTED None         Diagnostic Impression  Active Problems:    Paranoid schizophrenia ([facility] [address]  Resolved Problems:    * No resolved hospital problems. *          Medications   QUEtiapine  100 mg Oral Nightly     traZODone, nicotine polacrilex, acetaminophen, aluminum & magnesium hydroxide-simethicone, magnesium hydroxide, hydrOXYzine    Treatment Plan:     Admit to inpatient psychiatric treatment   Supportive therapy with medication management. Reviewed risks and benefits as well as potential side effects with patient.  Start Seroquel 100 mg nightly.  Therapeutic activities and groups   Follow up at [facility] after symptoms stabilized    Estimated length of stay: 5-7 days    SOTERO Rodarte - CNS  Psychiatric Advanced Practice Nurse  Anne-Marie voice recognition software used in portions of this document. Occasionally words are mis-transcribed    Patient Location:  [address] [place] [address]    Provider Location ([facility]): Gabriele Hearn [place]    This virtual visit was conducted via interactive/real-time audio/video.

## 2020-07-30 NOTE — CARE COORDINATION
CIARAN spoke with Ofelia Fletcher at [address] [place] [address]. Pt can be admitted after 2:30am. Tanvir Betancourt, Pt to be picked up at 2:30am.     Addendum:   spoke with Ofelia Fletcher at [facility], he states pt can come sooner. [facility], they will send someone over in about 5 minutes Updated pt.
Spoke with Connie NAJERA, Covid rule out beds filled. Will work on getting pt placed. They state hopefully tonight but unsure. Will keep SW posted. Faxed [facility] to start Connie.
Detail Level: Zone
Continue Regimen: Valtrex 1 gram as directed as needed
Continue Regimen: Betamethasone cream on the back as needed
Continue Regimen: Ketoconazole cream as directed three times weekly

## 2020-07-30 NOTE — H&P
HISTORY and [facility] [address]       NAME:  Shawna Prader  MRN: [id number]   YOB: 1995   Date: 7/30/2020   Age: 30 y.o. Gender: male     COMPLAINT AND PRESENT HISTORY:      Shawna Prader is 30 y.o.,  male, admitted because of Schizoaffective Disorder. Patient has auditory hallucinations, patient hears command voices to kill self and others. According to ED notes, patient came in with suicidal and homicidal ideations that were from his voices. Patient states that the voices pop up randomly and he would get triggered and get mad. \" am I safe here? \"  Patient states that he was on respirdal   and trazodone. He reports that he stopped his Respirdal since a year ago. Patient denies any current alcohol or substance abuse except for Marijuana. Patient admits to sleep disturbances, patient states that he sleep unless he forces hisself, \" Im scared to sleep, the voices tell me that I cant breathe and wake up gasping for breath\". Patient admits to appetite changes . Patient endorses poor concentration with racy thoughts. \" I forget a lot\"    Patient states that he has his own place, but had to remove himself, because of having bad thoughts. \" the voices want to kill myself, but I want to live\"    Patient has  somatic complaints. He complains of meg leg numbness and abdominal discomfort. Pt also complains of heart palpitation No significant lab values or procedures. No  chest pain or  shortness of breath. No fever/chills. Please see patient's psychiatric hx for more information.     DIAGNOSTIC RESULTS   Labs:  CBC:   Recent Labs     07/29/20  1530   WBC 5.5   HGB 14.4        BMP:    Recent Labs     07/29/20  1530      K 4.1      CO2 22   BUN 8   CREATININE 0.93   GLUCOSE 87     Hepatic:   Recent Labs     07/29/20  1530   AST 27   ALT 30   BILITOT 0.72   ALKPHOS 44     Lipids: No results for input(s): CHOL, HDL in the last 72 activity: None   Other Topics Concern    None   Social History Narrative    None        REVIEW OF SYSTEMS      No Known Allergies    No current facility-administered medications on file prior to encounter. Current Outpatient Medications on File Prior to Encounter   Medication Sig Dispense Refill    traZODone (DESYREL) 50 MG tablet Take 50 mg by mouth nightly                        General health:  Fairly good. No fever or chills. Skin:  No itching, redness or rash. Head, eyes, ears, nose, throat:  No headache, epistaxis, rhinorrhea hearing loss or sore throat. Neck:  No pain, stiffness or masses. Cardiovascular/Respiratory system:  No chest pain, palpitation, shortness of breath, coughing or expectoration. Gastrointestinal tract: No abdominal pain, nausea, vomiting, dysphagia, diarrhea or constipation. Genitourinary:  No burning on micturition. No hesitancy, urgency, frequency or discoloration of urine. Locomotor:  No bone or joint pains. No swelling or deformities. Neuropsychiatric:  See HPI. GENERAL PHYSICAL EXAM:     Vitals: /74   Pulse 74   Temp 98.1 °F (36.7 °C) (Oral)   Resp 14   Ht 5' 7\" (1.702 m)   Wt 193 lb (87.5 kg)   SpO2 99%   BMI 30.23 kg/m²  Body mass index is 30.23 kg/m². Pt was examined with a nurse present in the room. GENERAL APPEARANCE:  Pradip Fierro is 30 y.o.,  male, moderately obese, nourished, conscious, alert. Does not appear to be distress or pain at this time. SKIN:  Warm, dry, no cyanosis or jaundice. HEAD:  Normocephalic, atraumatic, no swelling or tenderness. EYES:  Pupils equal, reactive to light, Conjunctiva is clear, EOMs intact meg. eyelids WNL. EARS:  No discharge, no marked hearing loss. NOSE:  No rhinorrhea, epistaxis or septal deformity. THROAT:  Not congested. No ulceration bleeding or discharge.      NECK:  No stiffness, trachea central.  No palpable masses or L.N.      CHEST:  Symmetrical and equal on expansion. HEART:  Regular rate and rhythm. S1 > S2, No audible murmurs or gallops. LUNGS:  Equal on expansion, normal breath sounds. No adventitious sounds. ABDOMEN:  Obese. Soft on palpation. No localized tenderness. No guarding or rigidity. No palpable organomegaly. LYMPHATICS:  No palpable cervical Lymphadenopathy. LOCOMOTOR, BACK AND SPINE:  No tenderness or deformities. EXTREMITIES:  Symmetrical, no pretibial edema. Darios sign negative. No discoloration or ulcerations. NEUROLOGIC:  The patient is conscious, alert, oriented,Cranial nerve II-XII intact, taste and smell were not examined. No apparent focal sensory or motor deficits. Muscle strength equal Jackson. No facial droop, tongue protrudes centrally, no slurring of the speech. PROVISIONAL DIAGNOSES:      Active Problems:    * No active hospital problems. *  Resolved Problems:    * No resolved hospital problems.  *      SIDNEY NELSON, SOTERO - CNP on 7/30/2020 at 10:35 AM

## 2020-07-30 NOTE — PLAN OF CARE
setting, individualized assessments and care, continue to monitor pt on unit      SHORT-TERM GOALS:   Time frame for Short-Term Goals: 5-7 days    LONG-TERM GOALS:  Time frame for Long-Term Goals: 6 months  Members Present in Team Meeting: See Signature Sheet    Quintin Modi

## 2020-07-30 NOTE — ED NOTES
Pt resting in chair watching TV. Provided lunch box and juice. Will continue to monitor.         Florentino Rogel RN  07/29/20 7639

## 2020-07-30 NOTE — BH NOTE
`[facility]  Admission Note     Admission Type:   Admission Type: Voluntary    Reason for admission:  Reason for Admission: Increased hallucinations and paranioa    PATIENT STRENGTHS:  Strengths: Communication    Patient Strengths and Limitations:  Limitations: General negative or hopeless attitude about future/recovery    Addictive Behavior:   Addictive Behavior  In the past 3 months, have you felt or has someone told you that you have a problem with:  : None  Do you have a history of Chemical Use?: No  Do you have a history of Alcohol Use?: No  Do you have a history of Street Drug Abuse?: No  Histroy of Prescripton Drug Abuse?: No    Medical Problems:   History reviewed. No pertinent past medical history. Status EXAM:  Status and Exam  Normal: Yes  Facial Expression: Brightened  Affect: Appropriate  Level of Consciousness: Alert  Mood:Normal: Yes  Motor Activity:Normal: Yes  Interview Behavior: Cooperative  Preception: [place] to Person, [place] Rebel to Time, [place] to Place, [place] to Situation  Attention:Normal: No  Attention: Distractible, Unable to Concentrate  Thought Processes: Flt.of Ideas, Loose Assoc. Thought Content:Normal: No  Thought Content: Preoccupations  Hallucinations:  Auditory (Comment), Visual (Comment)  Delusions: No  Memory:Normal: Yes  Insight and Judgment: Yes  Present Suicidal Ideation: No  Present Homicidal Ideation: No    Tobacco Screening:  Practical Counseling, on admission, jesse X, if applicable and completed (first 3 are required if patient doesn't refuse):            ( )  Recognizing danger situations (included triggers and roadblocks)                    ( )  Coping skills (new ways to manage stress, exercise, relaxation techniques, changing routine, distraction)                                                           ( )  Basic information about quitting (benefits of quitting, techniques in how to quit, available resources  ( ) [facility]                                           (X ) Patient refused counseling  ( ) Patient has not smoked in the last 30 days    Metabolic Screening:    No results found for: LABA1C    No results found for: CHOL  No results found for: TRIG  No results found for: HDL  No components found for: LDLCAL  No results found for: LABVLDL      Body mass index is 30.23 kg/m². BP Readings from Last 2 Encounters:   07/30/20 133/78   07/29/20 (!) 150/96           Pt admitted with followings belongings:  Dentures: None  Vision - Corrective Lenses: None  Hearing Aid: None  Jewelry: None  Body Piercings Removed: N/A  Clothing: Footwear, Socks, Pants, Shirt, Undergarments (Comment)  Were All Patient Medications Collected?: Not Applicable  Other Valuables: Money (Comment), Wallet($1.00)     Valuables sent home with N/A. Valuables placed in safe in security envelope, number:  \[id number]. Patient's home medications were Reviewed. Patient oriented to surroundings and program expectations and copy of patient rights given. Received admission packet:  Yes. Consents reviewed, signed Yes. Refused No. Patient verbalize understanding:  Yes. Patient education on precautions: Yes    Pt admitted to 210 per Provider order. Pt changed into hospital attire. Pt scanned with metal detector. All pt belongings checked for contraband and locked in utility closet. Pt oriented to unit and rules. Handbook given. Nourishment provided. MD paged for orders. Will monitor pt for safety and behavior. Patient requested to shower and did so with no problem. Patient is calm and cooperative during admission process.                       Marilou Orosco RN

## 2020-07-30 NOTE — PLAN OF CARE
Problem: Altered Mood, Psychotic Behavior:  Goal: Able to verbalize decrease in frequency and intensity of hallucinations  Description: Able to verbalize decrease in frequency and intensity of hallucinations  Outcome: Ongoing  Note: Patient alert and orient x 4. Speech clear, soft spoken. Patient maintains fair eye contact during interaction, answers appropriately but with minimal information. Patient is calm and cooperative. Patient denies suicidal/homicidal/self harm ideations. Patient endorses auditory hallucinations of hearing voices. Patient denies visual hallucinations. Patient reports sleep is fair and appetite is good. Patient has been isolative to room except will seek out staff for needs. Support and encouragement provided. Safety maintained with every 15 minute checks. Problem: Altered Mood, Psychotic Behavior:  Goal: Absence of self-harm  Description: Absence of self-harm  Outcome: Ongoing     Problem: Tobacco Use:  Goal: Inpatient tobacco use cessation counseling participation  Description: Inpatient tobacco use cessation counseling participation  Outcome: Ongoing  Note: Patient refused tobacco education.

## 2020-07-30 NOTE — ED PROVIDER NOTES
[facility] [facility]  Emergency Department  Emergency Medicine Resident Sign-out     Care of Lazaro Almonte was assumed from Dr. Avel Worthington and is being seen for Suicidal (Suicidal thoughts & Homicidal thoughts. Has Voices in head )  . The patient's initial evaluation and plan have been discussed with the prior provider who initially evaluated the patient. EMERGENCY DEPARTMENT COURSE / MEDICAL DECISION MAKING:       MEDICATIONS GIVEN:  Orders Placed This Encounter   Medications    famotidine (PEPCID) tablet 20 mg       LABS / RADIOLOGY:     Labs Reviewed   CBC WITH AUTO DIFFERENTIAL - Abnormal; Notable for the following components:       Result Value    Immature Granulocytes 1 (*)     All other components within normal limits   URINE DRUG SCREEN - Abnormal; Notable for the following components:    Cannabinoid Scrn, Ur POSITIVE (*)     All other components within normal limits   URINE RT REFLEX TO CULTURE - Abnormal; Notable for the following components:    pH, UA >9.0 (*)     Leukocyte Esterase, Urine TRACE (*)     All other components within normal limits   HEPATIC FUNCTION PANEL - Abnormal; Notable for the following components: Total Protein 6.2 (*)     All other components within normal limits   TOX SCR, BLD, ED - Abnormal; Notable for the following components:    Acetaminophen Level <5 (*)     Salicylate Lvl <1 (*)     All other components within normal limits   BASIC METABOLIC PANEL   LIPASE   LACTIC ACID, WHOLE BLOOD   MICROSCOPIC URINALYSIS       Xr Chest Portable    Result Date: 7/29/2020  EXAMINATION: ONE XRAY VIEW OF THE CHEST 7/29/2020 3:09 pm COMPARISON: Chest x-ray, 03/19/2011 HISTORY: ORDERING SYSTEM PROVIDED HISTORY: abdominal brionna TECHNOLOGIST PROVIDED HISTORY: abdominal brionna FINDINGS: The cardiomediastinal contours are unremarkable given the portable AP technique. The lungs are clear bilaterally.   There is no evidence of focal consolidation, pulmonary edema, pleural effusion or pneumothorax. Stable chest with no acute cardiopulmonary process. RECENT VITALS:     Temp: 97.5 °F (36.4 °C),  Pulse: 77, Resp: 18, BP: (!) 150/96, SpO2: 98 %    This patient is a 30 y.o. Male with suicidal and homicidal ideation. Patient reports auditory hallucinations, however denies visual hallucinations. Patient has no specific plan. Patient is a poor historian, does not recall the events he had yesterday, states he continues to blackout. Patient reports intermittent alcohol abuse, does not know when he last drank alcohol. Patient denies illicit drug use. Patient has a negative tox screen, negative JERONIMO. Patient has a negative Medical work-up secondary to reporting abdominal pain. Patient admitted to [facility] [address] inpatient facility. Awaiting transfer. OUTSTANDING TASKS / RECOMMENDATIONS:    1. Awaiting transfer Caralee Lock     FINAL IMPRESSION:     1. Suicidal ideation        DISPOSITION:         DISPOSITION:  []  Discharge   [x]  Transfer -Caralee Lock   []  Admission -     []  Against Medical Advice   []  Eloped   FOLLOW-UP: No follow-up provider specified.    DISCHARGE MEDICATIONS: New Prescriptions    No medications on file           Leonora Gutierrez MD  Emergency Medicine Resident  [address] Millan MD  Resident  07/30/20 3311

## 2020-07-30 NOTE — BH NOTE
Patient given tobacco quitline number 33135324871 at this time, refusing to call at this time, states \" I just dont want to quit now\"- patient given information as to the dangers of long term tobacco use. Continue to reinforce the importance of tobacco cessation.

## 2020-07-31 PROBLEM — F20.0 PARANOID SCHIZOPHRENIA (HCC): Chronic | Status: ACTIVE | Noted: 2020-07-30

## 2020-07-31 PROCEDURE — 99232 SBSQ HOSP IP/OBS MODERATE 35: CPT | Performed by: PSYCHIATRY & NEUROLOGY

## 2020-07-31 PROCEDURE — 6370000000 HC RX 637 (ALT 250 FOR IP): Performed by: PSYCHIATRY & NEUROLOGY

## 2020-07-31 PROCEDURE — 1240000000 HC EMOTIONAL WELLNESS R&B

## 2020-07-31 RX ADMIN — TRAZODONE HYDROCHLORIDE 50 MG: 50 TABLET ORAL at 21:13

## 2020-07-31 RX ADMIN — BREXPIPRAZOLE 1 MG: 1 TABLET ORAL at 13:54

## 2020-07-31 ASSESSMENT — LIFESTYLE VARIABLES: HISTORY_ALCOHOL_USE: NO

## 2020-07-31 NOTE — GROUP NOTE
Group Therapy Note    Date: 7/31/2020    Group Start Time: 1115  Group End Time: 1150  Group Topic: Psychoeducation    CARMEL Esquivel      Patient declined to attend social skills group at [phone] despite encouragement from staff. 1:1 talk time offered by staff as alternative to group session.       Signature:  Tony Esquivel

## 2020-07-31 NOTE — GROUP NOTE
Group Therapy Note    Date: 7/31/2020    Group Start Time: 0845  Group End Time: 1627  Group Topic: [address]      Patient declined to attend community meeting and goal setting group at [address] [place] [address] despite encouragement from staff. 1:1 talk time offered by staff as alternative to group session.       Signature:  Tarun Ledesma

## 2020-07-31 NOTE — GROUP NOTE
Group Therapy Note    Date: 7/31/2020    Group Start Time: 1000  Group End Time: 9201  Group Topic: Psychotherapy    STCZ JORGE Fiore, DANIEL        Group Therapy Note    patient refused to attend psychotherapy group at 10am after encouragement from staff.          Signature:  JORGE Bermeo LSW

## 2020-07-31 NOTE — BH NOTE
Department of Psychiatry  Attending Progress Note  Chief Complaint: Paranoid schizophrenia ([facility] [address]     SUBJECTIVE:    Patient is experiencing auditory hallucinations. Some of the voices are telling him different things. Some of them are telling him bad things. He said he gets frustrated and irritated and becomes homicidal.  But he does not want to hurt anybody. He feels hopeless and useless. Restless. He said Seroquel and trazodone made him have too many nightmares and he is scared to take Seroquel anymore. He wants me to change it. I am going to start him on Rexulti 1 mg p.o. daily. OBJECTIVE    Physical  BP (!) 109/90   Pulse 116   Temp 97.3 °F (36.3 °C) (Oral)   Resp 16   Ht 5' 7\" (1.702 m)   Wt 193 lb (87.5 kg)   SpO2 99%   BMI 30.23 kg/m²      Mental Status Evaluation:  Patient is cooperative. He has adequate eye contact. He has adequate rapport. His psychomotor activity is decreased. His speech is within normal limits. His mood subjectively okay. Objectively appears to have restricted mood and affect. Thought process is within normal limits. Thought contents has delusions of persecution and auditory hallucinations. He denies any suicidal or homicidal thoughts or plans. He is of average intelligence. He is oriented to time place and person. His memory to recent remote and immediate events are within normal limits. He has poor attention and concentration. His insight and judgment are impaired.   Medications  Current Facility-Administered Medications   Medication Dose Route Frequency Provider Last Rate Last Dose    traZODone (DESYREL) tablet 50 mg  50 mg Oral Nightly PRN Hermes Gary MD        nicotine polacrilex (NICORETTE) gum 2 mg  2 mg Oral PRN Hermes Gary MD        acetaminophen (TYLENOL) tablet 650 mg  650 mg Oral Q4H PRN SOTERO Crump - CNS        aluminum & magnesium hydroxide-simethicone (MAALOX) 200-200-20 MG/5ML suspension 30 mL  30 mL Oral Q6H PRN Mikayla ORDONEZ Ck Espinoza, APRN - CNS        magnesium hydroxide (MILK OF MAGNESIA) 400 MG/5ML suspension 30 mL  30 mL Oral Daily PRN Mickey Franco APRN - CNS        hydrOXYzine (ATARAX) tablet 25 mg  25 mg Oral TID PRN Mickey Franco APRN - CNS   25 mg at 07/30/20 1120    QUEtiapine (SEROQUEL) tablet 100 mg  100 mg Oral Nightly SOTERO Jerry - CNS   100 mg at 07/30/20 2032         QUEtiapine  100 mg Oral Nightly       ASSESSMENT  Paranoid schizophrenia ([facility] [address]     Patient's Response to Treatment: negative    PLAN  Dragon voice recognition software used in portions of this document. To continue current management. Supportive therapy was provided. I am starting him on Rexulti 1 MDP were daily.

## 2020-07-31 NOTE — BH NOTE
Group Therapy Note     Date: 7/31/2020     Group Start Time: 1600  Group End Time: 3419  Group Topic: Wellness Group      CZ BHANUJA Guzman, RN      Patient declined to attend wellness group at 1600 despite encouragement from staff.  1:1 talk time offered by staff as alternative to group session.       Signature: ANUJA Kingston, RN

## 2020-07-31 NOTE — PLAN OF CARE
Problem: Altered Mood, Psychotic Behavior:  Goal: Able to verbalize decrease in frequency and intensity of hallucinations  Description: Able to verbalize decrease in frequency and intensity of hallucinations  7/31/2020 1214 by Vega Dee RN  Outcome: Ongoing  Patient admits to hearing voices. Problem: Altered Mood, Psychotic Behavior:  Goal: Absence of self-harm  Description: Absence of self-harm  7/31/2020 1214 by Vega Dee RN  Outcome: Ongoing  Patient is alert observed in room. Patient is currently denying thoughts to harm self and others. Patient admits to hearing voices, and complains of anxiety. Patient does not further specify content on auditory hallucinations. Patient has been isolative to room, only coming out for meals, and goes right back into room and eat meals. Patient is aloof of peers, preoccupied with own's thoughts. Patient reports adequate sleep and appetite. Patient does not have any medications in morning, does have Seroquel at night, and is compliant with medications. Patient encouraged to socialize with peers and attend unit programming. No further concerns voiced. Will continue to monitor.

## 2020-07-31 NOTE — GROUP NOTE
Group Therapy Note    Date: 7/31/2020    Group Start Time: 7452  Group End Time: 1450  Group Topic: Psychoeducation    [facility]      Patient declined to attend coping skills group at [phone] despite encouragement from staff. 1:1 talk time offered by staff as alternative to group session.         Signature:  Bettye Habermann

## 2020-07-31 NOTE — PLAN OF CARE
Problem: Altered Mood, Psychotic Behavior:  Goal: Able to verbalize decrease in frequency and intensity of hallucinations  Description: Able to verbalize decrease in frequency and intensity of hallucinations  7/30/2020 2118 by Adrianne Garcia LPN  Outcome: Ongoing  Note:      Problem: Altered Mood, Psychotic Behavior:  Goal: Able to verbalize decrease in frequency and intensity of hallucinations  Description: Able to verbalize decrease in frequency and intensity of hallucinations  7/30/2020 2118 by Adrianne Garcia LPN  Outcome: Ongoing     Problem: Altered Mood, Psychotic Behavior:  Goal: Absence of self-harm  Description: Absence of self-harm  7/30/2020 2118 by Adrianne Garcia LPN  Outcome: Ongoing    Patient pleasant, cooperative and appropriate with staff. Patient denies suicidal or homicidal ideations. Patient denies auditory/visual hallucinations. Patient reports feeling tired, isolative to room reading his bible today out for needs. Patient checks continue every 15 minutes to maintain safety.

## 2020-07-31 NOTE — PLAN OF CARE
[address] [place] [address]  Day 3 Interdisciplinary Treatment Plan NOTE    Review Date & Time: 7/31/2020 0930    Admission Type:   Admission Type: Voluntary    Reason for admission:  Reason for Admission: Increased hallucinations and paranioa  Estimated Length of Stay: 5-7 days  Estimated Discharge Date Update: to be determined by physician    PATIENT STRENGTHS:  Patient Strengths Strengths: Communication  Patient Strengths and Limitations:Limitations: Difficulty problem solving/relies on others to help solve problems, Tendency to isolate self, Lacks leisure interests  Addictive Behavior:Addictive Behavior  In the past 3 months, have you felt or has someone told you that you have a problem with:  : None  Do you have a history of Chemical Use?: No  Do you have a history of Alcohol Use?: No  Do you have a history of Street Drug Abuse?: No  Histroy of Prescripton Drug Abuse?: No  Medical Problems:History reviewed. No pertinent past medical history.     Risk:  Fall RiskTotal: 57  Avery Scale Avery Scale Score: 22  BVC Total: 0  Change in scores no Changes to plan of Care no    Status EXAM:   Status and Exam  Normal: No  Facial Expression: Flat  Affect: Appropriate  Level of Consciousness: Alert  Mood:Normal: Yes  Motor Activity:Normal: No  Motor Activity: Decreased  Interview Behavior: Cooperative  Preception: [place] to Person, [place] to Time, [place] to Place, [place] to Situation  Attention:Normal: Yes  Attention: Distractible  Thought Processes: Blocking  Thought Content:Normal: No  Thought Content: Paranoia  Hallucinations: None  Delusions: No  Memory:Normal: No  Memory: Poor Remote, Poor Recent  Insight and Judgment: Yes  Present Suicidal Ideation: No  Present Homicidal Ideation: No    Daily Assessment Last Entry:             Patient Currently in Pain: Denies  Daily Nutrition (WDL): Within Defined Limits    Patient Monitoring:  Frequency of Checks: 4 times per hour, close    Psychiatric Symptoms:   Depression Symptoms  Depression Symptoms: Isolative  Anxiety Symptoms  Anxiety Symptoms: Generalized  [place] Symptoms  [place] Symptoms: No problems reported or observed. Psychosis Symptoms  Delusion Type: No problems reported or observed. Suicide Risk CSSR-S:  1) Within the past month, have you wished you were dead or wished you could go to sleep and not wake up? : No  2) Have you actually had any thoughts of killing yourself? : No  6) Have you ever done anything, started to do anything, or prepared to do anything to end your life?: No  Change in Result  NA  Change in Plan of care  NA       EDUCATION:   EDUCATION:   Learner Progress Toward Treatment Goals: Reviewed results and recommendations of this team, Reviewed group plan and strategies, Reviewed signs, symptoms and risk of self harm and violent behavior, Reviewed goals and plan of care    Method:small group, individual verbal education    Outcome:verbalized by patient, but needs reinforcement to obtain goals    PATIENT GOALS:  Short term: Patient declined to attend his treatment team meeting.    Long term:    PLAN/TREATMENT RECOMMENDATIONS UPDATE: continue with group therapies, increased socialization, continue planning for after discharge goals, continue with medication compliance    SHORT-TERM GOALS UPDATE:   Time frame for Short-Term Goals: 5-7 days    LONG-TERM GOALS UPDATE:   Time frame for Long-Term Goals: 6 months  Members Present in Team Meeting: See Signature [address]

## 2020-07-31 NOTE — PROGRESS NOTES
BHI Biopsychosocial Assessment    Current Level of Psychosocial Functioning     Independent X   Dependent    Minimal Assist     Comments:    Psychosocial High Risk Factors (check all that apply)    Unable to obtain meds   Chronic illness/pain    Substance abuse   Lack of Family Support X    Financial stress X   Isolation X   Inadequate Community Resources  Suicide attempt(s)   Not taking medications   Victim of crime   Developmental Delay  Unable to manage personal needs    Age 72 or older   Homeless  No transportation   Readmission within 30 days  Unemployment X   Traumatic Event    Comments:   Psychiatric Advanced Directives:  N/A   Family to Involve in Treatment:   No ROXANN for family signed. Sexual Orientation:    Heterosexual   Patient Strengths:  Pt is independent in self-care activities. Patient Barriers:   Pt lacks support. Opiate Education Provided:  N/A   CMHC/mental health history:  Not linked  Plan of Care   medication management, group/individual therapies, family meetings, psycho -education, treatment team meetings to assist with stabilization    Initial Discharge Plan:    Pt will need to be discharged to a residence of choice (pt stays with others who allow him) and linked with a CMHC. Clinical Summary:    Pt is a 30 y.o.  male who presented to the ED reporting hearing voices, SI, and worries he might harm others, due to Demons inside him. Pt discussed with writer fighting off 5 demons and how his spiritual beliefs involved Demonology. Pt denied current SI and HI. Pt reported that he was not sure if he had hallucinated, because too many people were coming in and out of his room. Pt was oriented to time, place, person, and situation. Pt was cooperative and polite during assessment, but hid under the blankets saying he didn't like the bright lights.  Pt reported he is not linked, but also reported prescriptions for Trazodone and Risperdal. Pt reported he was not sure who prescribed these. Pt has no income. Pt reported he lives with people for short times then leaves stating \"I travel. \". Pt denied having support system reporting \"I have no family, everyone's dead. \". Despite this, pt denied trauma hx. Pt denied legal issues. Pt reported using marijuana every couple months, but did not feel this was problematic. Pt reported desire to speak with Spiritual Care and Spiritual Care was called. Pt will need to be discharged to a residence of choice (pt stays with others who allow him) and linked with a CMHC.

## 2020-08-01 PROCEDURE — 6370000000 HC RX 637 (ALT 250 FOR IP): Performed by: NURSE PRACTITIONER

## 2020-08-01 PROCEDURE — 1240000000 HC EMOTIONAL WELLNESS R&B

## 2020-08-01 PROCEDURE — 6370000000 HC RX 637 (ALT 250 FOR IP): Performed by: PSYCHIATRY & NEUROLOGY

## 2020-08-01 RX ADMIN — TRAZODONE HYDROCHLORIDE 50 MG: 50 TABLET ORAL at 21:57

## 2020-08-01 RX ADMIN — BREXPIPRAZOLE 1 MG: 1 TABLET ORAL at 21:57

## 2020-08-01 NOTE — BH NOTE
Pt refused scheduled AM medication despite encouragement from [facility]. Pt states, \" It makes me too tired. \"

## 2020-08-01 NOTE — PLAN OF CARE
Problem: Altered Mood, Psychotic Behavior:  Goal: Absence of self-harm  Description: Absence of self-harm  7/31/2020 2130 by Dolly Ramsey LPN  Outcome: Ongoing  Note: Pt denies thoughts of self harm and is agreeable to seeking out should thoughts of self harm arise. Safe environment maintained. Q15 minute checks for safety cont per unit policy. Will cont to monitor for safety and provides support and reassurance as needed.

## 2020-08-01 NOTE — VIRTUAL HEALTH
Patient Location:  [address] [place] [address]    Provider Location (City/State): [place] [address]    This virtual visit was conducted via interactive/real-time audio/video. This visit was conducted during the [place] pandemic. Department of Psychiatry  Attending Progress Note  Chief Complaint: Paranoid schizophrenia ([facility] [address]     SUBJECTIVE:  Dyllan is refused to see psychiatric mental health nurse practitioner today via telehealth visit. NP has reviewed recent charting and medications. He had reports nightmares and fear of taking the Seroquel and Trazodone and was switched to [address] yesterday. He was compliant with it yesterday, and then refused it today due to tiredness. NP adjusted time to night, begin tonight. Consider restarting Risperdal. Dyllan endorses a history of auditory hallucinations and that he has been possessed with demons at recent admission. Notes support that he presents as irritable, restless, and frustrated at times. At this time, there is no safe alternative other than in-patient psychiatric hospitalization.      OBJECTIVE    Physical  /84   Pulse 76   Temp 98.2 °F (36.8 °C) (Oral)   Resp 16   Ht 5' 7\" (1.702 m)   Wt 193 lb (87.5 kg)   SpO2 99%   BMI 30.23 kg/m²      Mental Status Evaluation:  Orientation: alertness: alert   Mood:. irritable      Affect:  normal and mood-congruent      Appearance:  age appropriate   Activity:  Psychomotor Agitation   Gait/Posture: Normal   Speech:  delayed   Thought Process:  Blocked, circumstantial   Thought Content:  delusions and hallucinations   Sensorium:  time/date and situation   Cognition:  BARRY   Memory: BARRY   Insight:  limited   Judgment: limited   Suicidal Ideations: passive   Homicidal Ideations: Negative for homicidal ideation      Medication Side Effects: present       Attention Span attention span appeared shorter than expected for age       Labs  Recent Results (from the past 67 hour(s))   EKG 12 Lead Collection Time: 07/29/20  2:49 PM   Result Value Ref Range    Ventricular Rate 78 BPM    Atrial Rate 78 BPM    P-R Interval 144 ms    QRS Duration 86 ms    Q-T Interval 376 ms    QTc Calculation (Bazett) 428 ms    P Axis 67 degrees    R Axis 41 degrees    T Axis 37 degrees   CBC Auto Differential    Collection Time: 07/29/20  3:30 PM   Result Value Ref Range    WBC 5.5 3.5 - 11.3 k/uL    RBC 4.77 4.21 - 5.77 m/uL    Hemoglobin 14.4 13.0 - 17.0 g/dL    Hematocrit 44.7 40.7 - 50.3 %    MCV 93.7 82.6 - 102.9 fL    MCH 30.2 25.2 - 33.5 pg    MCHC 32.2 28.4 - 34.8 g/dL    RDW 12.9 11.8 - 14.4 %    Platelets 657 894 - 659 k/uL    MPV 9.9 8.1 - 13.5 fL    NRBC Automated 0.0 0.0 per 100 WBC    Differential Type NOT REPORTED     Seg Neutrophils 64 36 - 65 %    Lymphocytes 26 24 - 43 %    Monocytes 7 3 - 12 %    Eosinophils % 1 1 - 4 %    Basophils 1 0 - 2 %    Immature Granulocytes 1 (H) 0 %    Segs Absolute 3.57 1.50 - 8.10 k/uL    Absolute Lymph # 1.41 1.10 - 3.70 k/uL    Absolute Mono # 0.39 0.10 - 1.20 k/uL    Absolute Eos # 0.05 0.00 - 0.44 k/uL    Basophils Absolute 0.03 0.00 - 0.20 k/uL    Absolute Immature Granulocyte 0.03 0.00 - 0.30 k/uL    WBC Morphology NOT REPORTED     RBC Morphology NOT REPORTED     Platelet Estimate NOT REPORTED    Basic Metabolic Panel    Collection Time: 07/29/20  3:30 PM   Result Value Ref Range    Glucose 87 70 - 99 mg/dL    BUN 8 6 - 20 mg/dL    CREATININE 0.93 0.70 - 1.20 mg/dL    Bun/Cre Ratio NOT REPORTED 9 - 20    Calcium 9.2 8.6 - 10.4 mg/dL    Sodium 139 135 - 144 mmol/L    Potassium 4.1 3.7 - 5.3 mmol/L    Chloride 104 98 - 107 mmol/L    CO2 22 20 - 31 mmol/L    Anion Gap 13 9 - 17 mmol/L    GFR Non-African American >60 >60 mL/min    GFR African American >60 >60 mL/min    GFR Comment          GFR Staging NOT REPORTED    LIPASE    Collection Time: 07/29/20  3:30 PM   Result Value Ref Range    Lipase 20 13 - 60 U/L   Lactic Acid, Whole Blood    Collection Time: 07/29/20  3:30 PM Ketones, Urine NEGATIVE NEGATIVE    Specific Gravity, UA 1.022 1.005 - 1.030    Urine Hgb NEGATIVE NEGATIVE    pH, UA >9.0 (H) 5.0 - 8.0    Protein, UA NEGATIVE NEGATIVE    Urobilinogen, Urine Normal Normal    Nitrite, Urine NEGATIVE NEGATIVE    Leukocyte Esterase, Urine TRACE (A) NEGATIVE    Urinalysis Comments NOT REPORTED    Microscopic Urinalysis    Collection Time: 07/29/20  6:55 PM   Result Value Ref Range    -          WBC, UA 2 TO 5 0 - 5 /HPF    RBC, UA None 0 - 4 /HPF    Casts UA NOT REPORTED 0 - 8 /LPF    Crystals, UA NOT REPORTED None /HPF    Epithelial Cells UA None 0 - 5 /HPF    Renal Epithelial, UA NOT REPORTED 0 /HPF    Bacteria, UA NOT REPORTED None    Mucus, UA NOT REPORTED None    Trichomonas, UA NOT REPORTED None    Amorphous, UA NOT REPORTED None    Other Observations UA NOT REPORTED NOT REQ. Yeast, UA NOT REPORTED None   COVID-19    Collection Time: 07/30/20  6:50 AM    Specimen: Other   Result Value Ref Range    SARS-CoV-2 Not Detected Not Detected    SARS-CoV-2, Rapid          Source . NASOPHARYNGEAL SWAB     SARS-CoV-2, PCR             Medications  Current Facility-Administered Medications   Medication Dose Route Frequency Provider Last Rate Last Dose    brexpiprazole (REXULTI) tablet 1 mg  1 mg Oral Daily Dmitry Found, APRN - CNP        traZODone (DESYREL) tablet 50 mg  50 mg Oral Nightly PRN Jus King MD   50 mg at 07/31/20 2113    nicotine polacrilex (NICORETTE) gum 2 mg  2 mg Oral PRN Jus King MD        acetaminophen (TYLENOL) tablet 650 mg  650 mg Oral Q4H PRN Mikayla Parrish APRN - CNS        aluminum & magnesium hydroxide-simethicone (MAALOX) 200-200-20 MG/5ML suspension 30 mL  30 mL Oral Q6H PRN Mikayla Parrish APRN - CNS        magnesium hydroxide (MILK OF MAGNESIA) 400 MG/5ML suspension 30 mL  30 mL Oral Daily PRN Jyoti Stake, APRN - CNS        hydrOXYzine (ATARAX) tablet 25 mg  25 mg Oral TID PRN Jyoti Stake, APRN - CNS   25 mg at 07/30/20 1120         brexpiprazole  1 mg Oral Daily       ASSESSMENT  Paranoid schizophrenia ([facility] [address]     Patient's Response to Treatment: slow    PLAN    · Continue inpatient psychiatric treatment. · NP adjusted Rexulti medication to night. · Supportive therapy with medication management. Reviewed risks and benefits of medication as well as potential side effects with patient. · Continue to encourage engagement in therapeutic activities and groups. · Follow up at [facility] after symptoms stabilized.         Electronically signed by SOTERO Carcamo CNP on 8/1/2020 at 11:55 AM.

## 2020-08-01 NOTE — GROUP NOTE
Group Therapy Note    Date: 8/1/2020    Group Start Time: 1330  Group End Time: 5739  Group Topic: Recreational    [address] [facility], [facility]    Patient refused to attend Recreational Therapy Group at 1330 after encouragement from staff. 1:1 talk time offered.     Signature:  Allyson Barry

## 2020-08-01 NOTE — PLAN OF CARE
Problem: Altered Mood, Psychotic Behavior:  Goal: Able to verbalize decrease in frequency and intensity of hallucinations  Description: Able to verbalize decrease in frequency and intensity of hallucinations  Outcome: Ongoing   Pt reports continued auditory hallucinations. Pt is evasive with content. Pt avoids eye contact leaving head covered up with blanket. Pt isolates to room coming out for meals and needs only. Pt is disheveled and encouraged to tend to hygiene and ADL's. Problem: Altered Mood, Psychotic Behavior:  Goal: Absence of self-harm  Description: Absence of self-harm  Outcome: Ongoing   Pt is free from self harm and agrees to feeling safe on the unit. Pt denies suicidal ideations. Pt agrees to seek out staff if thoughts of self harm should arise. Pt. Remains on q15 min checks and frequent spontaneous checks throughout shift. Pt. Safety maintained.

## 2020-08-01 NOTE — BH NOTE
Pt did not attend open recreation group due to resting in room and choosing not to attend. 1:1 offered and pt declined.

## 2020-08-02 PROCEDURE — 6370000000 HC RX 637 (ALT 250 FOR IP): Performed by: NURSE PRACTITIONER

## 2020-08-02 PROCEDURE — 6370000000 HC RX 637 (ALT 250 FOR IP): Performed by: PSYCHIATRY & NEUROLOGY

## 2020-08-02 PROCEDURE — 1240000000 HC EMOTIONAL WELLNESS R&B

## 2020-08-02 RX ADMIN — BREXPIPRAZOLE 1 MG: 1 TABLET ORAL at 22:20

## 2020-08-02 RX ADMIN — TRAZODONE HYDROCHLORIDE 50 MG: 50 TABLET ORAL at 22:20

## 2020-08-02 NOTE — GROUP NOTE
Group Therapy Note    Date: 8/2/2020    Group Start Time: 7783  Group End Time: 7617  Group Topic: [address], [address]    Patient refused to attend Recreational Therapy Group at 1204 after encouragement from staff. 1:1 talk time offered.     Signature:  Karen Schroeder

## 2020-08-02 NOTE — GROUP NOTE
Group Therapy Note    Date: 8/2/2020    Group Start Time: 1330  Group End Time: 0698  Group Topic: Recreational    [address] [facility], [facility]    Patient refused to attend Recreational Therapy Group at 1330 after encouragement from staff. 1:1 talk time offered.     Signature:  Vern Mendoza

## 2020-08-02 NOTE — PLAN OF CARE
Problem: Altered Mood, Psychotic Behavior:  Goal: Able to verbalize decrease in frequency and intensity of hallucinations  Description: Able to verbalize decrease in frequency and intensity of hallucinations  Outcome: Ongoing   Pt denies auditory/visual hallucinations. Pt out more in the afternoon and social with select peers. Pt is tending to ADL's and hygiene. Pt reports readiness for discharge. Pt. Remains on q15 min checks and frequent spontaneous checks throughout shift. Pt. Safety maintained.

## 2020-08-02 NOTE — PLAN OF CARE
Problem: Altered Mood, Psychotic Behavior:  Goal: Able to verbalize decrease in frequency and intensity of hallucinations  Description: Able to verbalize decrease in frequency and intensity of hallucinations  8/1/2020 2043 by Yung Andrade RN  Outcome: Ongoing  Note: Patient preoccupied and unable to assess     Problem: Altered Mood, Psychotic Behavior:  Goal: Absence of self-harm  Description: Absence of self-harm  8/1/2020 2043 by Yung Andrade RN  Outcome: Ongoing  Note: Denies self harm

## 2020-08-02 NOTE — PLAN OF CARE
Problem: Altered Mood, Psychotic Behavior:  Goal: Able to verbalize decrease in frequency and intensity of hallucinations  Description: Able to verbalize decrease in frequency and intensity of hallucinations  8/2/2020 1445 by Sisi Rivera LPN  Outcome: Ongoing   Pt currently denies auditory/visual hallucinations. Pt isolates to room coming out for meals and needs only. Problem: Altered Mood, Psychotic Behavior:  Goal: Absence of self-harm  Description: Absence of self-harm  8/2/2020 1445 by Sisi Rivera LPN  Outcome: Ongoing   Pt is free from self harm and agrees to feeling safe on the unit. Pt denies suicidal ideations. Pt. Remains on q15 min checks and frequent spontaneous checks throughout shift. Pt. Safety maintained.

## 2020-08-02 NOTE — VIRTUAL HEALTH
Department of Psychiatry  Attending PMHNP Progress Note  Chief Complaint: Paranoid schizophrenia ([address]     SUBJECTIVE:  Dyllan is seen today via virtual telepsychiatry with staff present. Telehealth is in place due to [place] pandemic. He reports feeling better today. He reports feeling less irritable. He explains that he has to return to work on Monday with [employer] [employer], and that his fiance can pick him up today. He denies all, no SI/HI thoughts, and no auditory or visual hallucinations. He denies being possessed by demons. He continues to be evasive with staff, participates minimally in his treatment, and self-isolates in his room. He has been selective about medications. He is refusing to take Rexulti during the day today, and is requesting to take it at night. He had recent poor compliance with Seroquel including fear of taking it. Consider malingering. At this time, there is no safe alternative other than in-patient psychiatric hospitalization. OBJECTIVE    Physical  /65   Pulse 83   Temp 97.9 °F (36.6 °C)   Resp 16   Ht 5' 7\" (1.702 m)   Wt 193 lb (87.5 kg)   SpO2 99%   BMI 30.23 kg/m²      Mental Status Evaluation:  Orientation: alertness: alert   Mood:.  Euthymic mood      Affect:  anxious      Appearance:  age appropriate   Activity:  Disheveled, within normal limits   Gait/Posture: Normal, no issues with gait or ambulation   Speech:  Spontaneous speech, normal rate and rhythm   Thought Process:  Possibly Circumstantial, more logical and organized today, focused on returning to work   Thought Content:  Denies delusions, denies hallucinations   Sensorium:  time/date and situation   Cognition:  intact   Memory: Recent and remote  Grossly intact   Insight:  Limited to fair   Judgment: Limited to poor   Suicidal Ideations: denies   Homicidal Ideations: Negative for homicidal ideation      Medication Side Effects: denies       Attention Span attention span appeared shorter than expected for age Labs  No results found for this or any previous visit (from the past 72 hour(s)). Medications  Current Facility-Administered Medications   Medication Dose Route Frequency Provider Last Rate Last Dose    brexpiprazole (REXULTI) tablet 1 mg  1 mg Oral Daily SOTERO Macias CNP   1 mg at 08/01/20 2157    traZODone (DESYREL) tablet 50 mg  50 mg Oral Nightly PRN Ceiclia Marie MD   50 mg at 08/01/20 2157    nicotine polacrilex (NICORETTE) gum 2 mg  2 mg Oral PRN Cecilia Marie MD        acetaminophen (TYLENOL) tablet 650 mg  650 mg Oral Q4H PRN Mikayla Michael Nipper, APRN - CNS        aluminum & magnesium hydroxide-simethicone (MAALOX) 200-200-20 MG/5ML suspension 30 mL  30 mL Oral Q6H PRN Mikayla Michael Nipper, APRN - CNS        magnesium hydroxide (MILK OF MAGNESIA) 400 MG/5ML suspension 30 mL  30 mL Oral Daily PRN SOTERO Gutierres - CNS        hydrOXYzine (ATARAX) tablet 25 mg  25 mg Oral TID PRN Wallacebert Ferraris, APRN - CNS   25 mg at 07/30/20 1120         brexpiprazole  1 mg Oral Daily       ASSESSMENT  Paranoid schizophrenia ([facility]     Patient's Response to Treatment: fair    PLAN    · Continue inpatient psychiatric treatment. · NP adjusted Rexulti medication to night on 8/2/2020. · Supportive therapy with medication management. Reviewed risks and benefits of medication as well as potential side effects with patient. · Continue to encourage engagement in therapeutic activities and groups. · Follow up at [facility] [place] after symptoms stabilized. · Consider early week discharge once compliant with Psychotropic medications. Social work for discharge planning. Electronically signed by SOTERO Macias CNP on 8/2/2020 at 4:44 PM.      Patient Location:  [address] [place] [address]    Provider Location ([facility]): Kadeem leonard [facility].    This virtual visit was conducted via interactive/real-time audio/video.  This visit was conducted during the [facility] pandemic.

## 2020-08-03 VITALS
SYSTOLIC BLOOD PRESSURE: 118 MMHG | TEMPERATURE: 97.5 F | BODY MASS INDEX: 30.29 KG/M2 | HEART RATE: 74 BPM | RESPIRATION RATE: 14 BRPM | WEIGHT: 193 LBS | OXYGEN SATURATION: 99 % | DIASTOLIC BLOOD PRESSURE: 73 MMHG | HEIGHT: 67 IN

## 2020-08-03 PROCEDURE — 99239 HOSP IP/OBS DSCHRG MGMT >30: CPT | Performed by: PSYCHIATRY & NEUROLOGY

## 2020-08-03 RX ORDER — TRAZODONE HYDROCHLORIDE 50 MG/1
50 TABLET ORAL NIGHTLY PRN
Qty: 30 TABLET | Refills: 0 | Status: SHIPPED | OUTPATIENT
Start: 2020-08-03

## 2020-08-03 RX ORDER — HYDROXYZINE HYDROCHLORIDE 25 MG/1
25 TABLET, FILM COATED ORAL 3 TIMES DAILY PRN
Qty: 90 TABLET | Refills: 0 | Status: SHIPPED | OUTPATIENT
Start: 2020-08-03 | End: 2020-08-13

## 2020-08-03 NOTE — BH NOTE
[facility]  Discharge Note    Pt discharged with followings belongings:   Dentures: None  Vision - Corrective Lenses: None  Hearing Aid: None  Jewelry: None  Body Piercings Removed: N/A  Clothing: Footwear, Socks, Pants, Shirt, Undergarments (Comment)  Were All Patient Medications Collected?: Not Applicable  Other Valuables: Money (Comment), Wallet($1.00)   Valuables sent home with patient. Valuables retrieved from safe, Security envelope number:  [id number]. and returned to patient. Patient education on aftercare instructions: yes  Information faxed to [facility] by nurse Patient verbalize understanding of AVS:  yes. Patient discharged to private residence via family member without incident.   Status EXAM upon discharge:  Status and Exam  Normal: Yes  Facial Expression: Brightened  Affect: Appropriate  Level of Consciousness: Alert  Mood:Normal: Yes  Mood: Anxious  Motor Activity:Normal: Yes  Motor Activity: Decreased  Interview Behavior: Cooperative  Preception: [place] to Person, Delman Boers to Time, [place] to Place, [place] to Situation  Attention:Normal: No  Attention: Distractible  Thought Processes: Circumstantial  Thought Content:Normal: Yes  Thought Content: Preoccupations  Hallucinations: None  Delusions: No  Memory:Normal: Yes  Memory: Poor Recent, Poor Remote  Insight and Judgment: Yes  Insight and Judgment: Poor Judgment, Poor Insight  Present Suicidal Ideation: No  Present Homicidal Ideation: No      Metabolic Screening:    No results found for: LABA1C    No results found for: CHOL  No results found for: TRIG  No results found for: HDL  No components found for: LDLCAL  No results found for: Raynette Felty, LPN

## 2020-08-03 NOTE — GROUP NOTE
Group Therapy Note    Date: 8/3/2020    Group Start Time: 0737  Group End Time: 3541  Group Topic: [address]        Group Therapy Note    Pt did not attend community meeting group d/t resting in room despite staff invitation to attend. 1:1 talk time offered as alternative to group session, pt declined.

## 2020-08-03 NOTE — PLAN OF CARE
Problem: Altered Mood, Psychotic Behavior:  Goal: Able to verbalize decrease in frequency and intensity of hallucinations  Description: Able to verbalize decrease in frequency and intensity of hallucinations  8/3/2020 0916 by Reyes Nathan  Outcome: Ongoing   Patient is not observed engaging in self talk, inappropriate laughter or actively gesturing at this time. Will continue to monitor and provide support as needed. Q15 minute checks maintained for safety. Problem: Altered Mood, Psychotic Behavior:  Goal: Absence of self-harm  Description: Absence of self-harm  8/3/2020 0916 by Reyes Nathan  Outcome: Ongoing   Safe environment maintained and patient remains free from self-harm. Agreeable to seeking staff should thoughts to harm self or others arise. Q15 minute checks for safety.

## 2020-08-03 NOTE — DISCHARGE INSTR - COC
Continuity of Care Form    Patient Name: Clint Oliveira   :  1995  MRN:  [id number]    Admit date:  2020  Discharge date:  ***    Code Status Order: No Order   Advance Directives:   [facility] Documentation     Date/Time Healthcare Directive Type of Healthcare Directive Copy in [address] [facility] [address] Agent's Name Healthcare Agent's Phone Number    20 [phone]  No, patient does not have an advance directive for healthcare treatment -- -- -- -- --          Admitting Physician:  Modesta Ash MD  PCP: No primary care provider on file. Discharging Nurse: [facility] Unit/Room#: 0110/0110-01  Discharging Unit Phone Number: ***    Emergency Contact:   Extended Emergency Contact Information  Primary Emergency Contact: [address] [address] Phone: [phone]  Mobile Phone: [phone]  Relation: Brother/Sister   needed? No    Past Surgical History:  History reviewed. No pertinent surgical history.     Immunization History:   Immunization History   Administered Date(s) Administered    DTaP 1993, 1993, 1994, 1997, 05/15/1998    Hepatitis A 2007    Hepatitis B 1993, 1993, 1994    Hib, unspecified 1993, 1993, 1994    MMR 1996, 2006    Meningococcal MCV4P (Menactra) 2007    Polio IPV (IPOL) 1993, 1994, 1997, 05/15/1998    Td, unspecified formulation 1994    Tdap (Boostrix, Adacel) 2006       Active Problems:  Patient Active Problem List   Diagnosis Code    Paranoid schizophrenia ([facility] F20.0       Isolation/Infection:   Isolation          No Isolation        Patient Infection Status     None to display          Nurse Assessment:  Last Vital Signs: /73   Pulse 74   Temp 97.5 °F (36.4 °C) (Oral)   Resp 14   Ht 5' 7\" (1.702 m)   Wt 193 lb (87.5 kg)   SpO2 99%   BMI 30.23 kg/m²     Last documented pain score (0-10 scale): Pain Level: 0  Last Weight:   Wt Readings from Last 1 Encounters:   20 193 lb (87.5 kg)     Mental Status:  {IP PT MENTAL STATUS:36174}    IV Access:  { ISAAC IV ACCESS:433331130}    Nursing Mobility/ADLs:  Walking   {CHP DME DQAU:839458924}  Transfer  {CHP DME YXVI:035642454}  Bathing  {CHP DME COIC:344803886}  Dressing  {CHP DME MTFX:736459359}  Toileting  {CHP DME HPGV:005472357}  Feeding  {CHP DME DAFU:671071938}  Med Admin  {P DME TMFU:528115734}  Med Delivery   { ISAAC MED Delivery:586571899}    Wound Care Documentation and Therapy:        Elimination:  Continence:   · Bowel: {YES / UU:48772}  · Bladder: {YES / EC:60066}  Urinary Catheter: {Urinary Catheter:238495656}   Colostomy/Ileostomy/Ileal Conduit: {YES / KY:47995}       Date of Last BM: ***  No intake or output data in the 24 hours ending 20 1154  No intake/output data recorded.     Safety Concerns:     [address] Safety Concerns:219309336}    Impairments/Disabilities:      [address] Impairments/Disabilities:074247562}    Nutrition Therapy:  Current Nutrition Therapy:   [address] Diet List:927585114}    Routes of Feeding: {CHP DME Other Feedings:987501608}  Liquids: {Slp liquid thickness:80664}  Daily Fluid Restriction: {CHP DME Yes amt example:292180542}  Last Modified Barium Swallow with Video (Video Swallowing Test): {Done Not Done HQYL:494310891}    Treatments at the Time of Hospital Discharge:   Respiratory Treatments: ***  Oxygen Therapy:  {Therapy; copd oxygen:49955}  Ventilator:    {[facility] Vent OWCA:181608188}    Rehab Therapies: {THERAPEUTIC INTERVENTION:3912972853}  Weight Bearing Status/Restrictions: [address] Weight Bearin}  Other Medical Equipment (for information only, NOT a DME order):  {EQUIPMENT:761994954}  Other Treatments: ***    Patient's personal belongings (please select all that are sent with patient):  {JANET DME Belongings:662585438}    RN SIGNATURE:  {Esignature:278610971}    CASE MANAGEMENT/SOCIAL WORK

## 2020-08-03 NOTE — GROUP NOTE
Group Therapy Note    Date: 8/3/2020    Group Start Time: 1000  Group End Time: 9036  Group Topic: Recreational    CARMEL Altman        Group Therapy Note    Attendees: 7/18         Patient's Goal:  To increase interpersonal interaction    Notes:  Patient attended and participated in group in a bright space. Status After Intervention:  Improved    Participation Level:  Active Listener and Interactive    Participation Quality: Appropriate and Attentive      Speech:  normal      Thought Process/Content: Logical  Linear      Affective Functioning: Congruent      Mood: euthymic      Level of consciousness:  Alert and Attentive      Response to Learning: Progressing to goal      Endings: None Reported    Modes of Intervention: Socialization, Activity and Reality-testing      Discipline Responsible: Psychoeducational Specialist      Signature:  Elpidio Altman

## 2020-08-03 NOTE — PROGRESS NOTES
CLINICAL PHARMACY NOTE: MEDS TO [address] Select Patient?: No  Total # of Prescriptions Filled: 3   The following medications were delivered to the patient:  · Hydroxyzine  · Trazodone  · Rexulti  Total # of Interventions Completed: 0  Time Spent (min): 30    Additional Documentation:

## 2020-08-03 NOTE — BH NOTE
Patient given tobacco quitline number 36771646789 at this time, refusing to call at this time, states \" I just dont want to quit now\"- patient given information as to the dangers of long term tobacco use. Continue to reinforce the importance of tobacco cessation.

## 2020-08-03 NOTE — DISCHARGE SUMMARY
medications which have CHANGED    Details   traZODone (DESYREL) 50 MG tablet Take 1 tablet by mouth nightly as needed for Sleep  Qty: 30 tablet, Refills: 0                Core Measures statement:   Not applicable    Discharge Exam:  Level of consciousness:  Within normal limits  Appearance: Street clothes, seated, with good grooming  Behavior/Motor: No abnormalities noted  Attitude toward examiner:  Cooperative, attentive, good eye contact  Speech:  spontaneous, normal rate, normal volume and well articulated  Mood:  euthymic  Affect:  mood congruent  Thought processes:  linear, goal directed and coherent  Thought content:  Homocidal ideation denies  Suicidal Ideation:  denies suicidal ideation  Delusions:  no evidence of delusions  Perceptual Disturbance:  denies any perceptual disturbance  Cognition:  In tact  Memory: age appropriate  Insight & Judgement: fair  Medication side effects:  denies     Disposition: home    Patient Instructions: Activity: activity as tolerated    Follow-up as scheduled with PCP and [facility]     Time Spent: 35 minutes    Engagement: Patient displayed a good level of engagement with the treatments offered during this admission. Discharge planning, findings, and recommendations were discussed with  Patient. Signed:  Elli Nunez   8/3/2020  8:32 AM  Dragon voice recognition software used in portions of this document.

## 2020-08-04 NOTE — CARE COORDINATION
Name: Hernan Mares    : 1995    Discharge Date: 8/3/20    Primary Auth/Cert #: [id number]    Discharge Medications:      Medication List      START taking these medications    brexpiprazole 1 MG Tabs tablet  Commonly known as:  REXULTI  Take 1 tablet by mouth nightly  Notes to patient:   To help clear thoughts     hydrOXYzine 25 MG tablet  Commonly known as:  ATARAX  Take 1 tablet by mouth 3 times daily as needed for Itching or Anxiety  Notes to patient:  For anxiety         CHANGE how you take these medications    traZODone 50 MG tablet  Commonly known as:  DESYREL  Take 1 tablet by mouth nightly as needed for Sleep  What changed:    · when to take this  · reasons to take this  Notes to patient:  For sleep            Where to Get Your Medications      These medications were sent to [address] [address]    Phone:  [phone]   · brexpiprazole 1 MG Tabs tablet  · hydrOXYzine 25 MG tablet  · traZODone 50 MG tablet         Follow Up Appointment: [facility] [address]  [address]  [phone]  On 2020  Appointment with Sherrill Prabhakar @11:30AM

## 2021-02-03 ENCOUNTER — APPOINTMENT (OUTPATIENT)
Dept: GENERAL RADIOLOGY | Age: 26
End: 2021-02-03
Payer: MEDICARE

## 2021-02-03 ENCOUNTER — HOSPITAL ENCOUNTER (EMERGENCY)
Age: 26
Discharge: HOME | End: 2021-02-03
Attending: EMERGENCY MEDICINE
Payer: MEDICARE

## 2021-02-03 VITALS
HEART RATE: 78 BPM | TEMPERATURE: 98.3 F | HEIGHT: 67 IN | SYSTOLIC BLOOD PRESSURE: 124 MMHG | WEIGHT: 193 LBS | DIASTOLIC BLOOD PRESSURE: 87 MMHG | OXYGEN SATURATION: 99 % | BODY MASS INDEX: 30.29 KG/M2 | RESPIRATION RATE: 16 BRPM

## 2021-02-03 DIAGNOSIS — R07.81 RIB PAIN ON LEFT SIDE: Primary | ICD-10-CM

## 2021-02-03 PROCEDURE — 96372 THER/PROPH/DIAG INJ SC/IM: CPT

## 2021-02-03 PROCEDURE — 6370000000 HC RX 637 (ALT 250 FOR IP): Performed by: STUDENT IN AN ORGANIZED HEALTH CARE EDUCATION/TRAINING PROGRAM

## 2021-02-03 PROCEDURE — 93005 ELECTROCARDIOGRAM TRACING: CPT | Performed by: STUDENT IN AN ORGANIZED HEALTH CARE EDUCATION/TRAINING PROGRAM

## 2021-02-03 PROCEDURE — 71046 X-RAY EXAM CHEST 2 VIEWS: CPT

## 2021-02-03 PROCEDURE — 6360000002 HC RX W HCPCS: Performed by: STUDENT IN AN ORGANIZED HEALTH CARE EDUCATION/TRAINING PROGRAM

## 2021-02-03 PROCEDURE — 99283 EMERGENCY DEPT VISIT LOW MDM: CPT

## 2021-02-03 RX ORDER — KETOROLAC TROMETHAMINE 30 MG/ML
30 INJECTION, SOLUTION INTRAMUSCULAR; INTRAVENOUS ONCE
Status: COMPLETED | OUTPATIENT
Start: 2021-02-03 | End: 2021-02-03

## 2021-02-03 RX ORDER — LIDOCAINE 4 G/G
1 PATCH TOPICAL ONCE
Status: DISCONTINUED | OUTPATIENT
Start: 2021-02-03 | End: 2021-02-03 | Stop reason: HOSPADM

## 2021-02-03 RX ORDER — IBUPROFEN 800 MG/1
800 TABLET ORAL EVERY 6 HOURS PRN
Qty: 30 TABLET | Refills: 0 | Status: SHIPPED | OUTPATIENT
Start: 2021-02-03

## 2021-02-03 RX ORDER — LIDOCAINE 4 G/G
1 PATCH TOPICAL DAILY
Qty: 30 PATCH | Refills: 0 | Status: SHIPPED | OUTPATIENT
Start: 2021-02-03 | End: 2021-03-05

## 2021-02-03 RX ADMIN — KETOROLAC TROMETHAMINE 30 MG: 30 INJECTION, SOLUTION INTRAMUSCULAR at 14:06

## 2021-02-03 ASSESSMENT — PAIN DESCRIPTION - LOCATION: LOCATION: RIB CAGE

## 2021-02-03 ASSESSMENT — ENCOUNTER SYMPTOMS
VOMITING: 0
BACK PAIN: 0
WHEEZING: 0
ABDOMINAL PAIN: 0
CHEST TIGHTNESS: 0
NAUSEA: 0
COUGH: 0
SHORTNESS OF BREATH: 0

## 2021-02-03 ASSESSMENT — PAIN DESCRIPTION - ORIENTATION: ORIENTATION: LEFT

## 2021-02-03 ASSESSMENT — PAIN DESCRIPTION - PAIN TYPE: TYPE: ACUTE PAIN
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
## 2021-02-03 NOTE — ED NOTES
Pt arrived to ED alert and oriented x4. Pt c/o rib pain x3 days. Pt reports pain to his posterior ribs on the left side, states that he is unsure if he injured that area. Pt reports pain is worse with deep inspiration and laughing, states that he has taken OTC pain meds with no relief. Pt denies having been around anyone suspected to have COVID-19 or anyone that has been sick, denies recent travel outside the [facility] or [address]. RR even and unlabored. NAD noted. Will continue with plan of care.      Minoo Buckley RN  02/03/21 4663

## 2021-02-03 NOTE — ED PROVIDER NOTES
[facility] ED  Emergency Department Encounter  Emergency Medicine Resident     Pt Name: Edie Meng  MRN: [id number]  [place] 1995  Date of evaluation: 2/3/21  PCP:  No primary care provider on file. CHIEF COMPLAINT       Chief Complaint   Patient presents with    Rib Pain     Left side x3 days, no known injury       HISTORY OFPRESENT ILLNESS  (Location/Symptom, Timing/Onset, Context/Setting, Quality, Duration, Modifying Factors,Severity.)      Edie Meng is a 31 yo male who presents with left-sided rib pain. Patient states over the past 3 days he has noticed sharp stabbing pain to his left ribs which is reproducible with coughing, taking deep breaths, twisting certain ways, and pushing on it. 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 He denies any fevers, chills, sweats, chest pain or difficulty breathing, abdominal pain, nausea, vomiting. Denies any history of blood clots, leg swelling, calf cramping, hemoptysis, recent surgery or long travel. PAST MEDICAL / SURGICAL / SOCIAL / FAMILY HISTORY      has no past medical history on file. Paranoid schizophrenia     has no past surgical history on file.   Denies    Social History     Socioeconomic History    Marital status: Single     Spouse name: Not on file    Number of children: Not on file    Years of education: Not on file    Highest education level: Not on file   Occupational History    Not on file   Social Needs    Financial resource strain: Not on file    Food insecurity     Worry: Not on file     Inability: Not on file    Transportation needs     Medical: Not on file     Non-medical: Not on file   Tobacco Use    Smoking status: Former Smoker    Smokeless tobacco: Never Used   Substance and Sexual Activity    Alcohol use: Not Currently    Drug use: Yes     Types: Marijuana     Comment: occasional marijuana use    Sexual activity: Yes Partners: Female   Lifestyle    Physical activity     Days per week: Not on file     Minutes per session: Not on file    Stress: Not on file   Relationships    Social connections     Talks on phone: Not on file     Gets together: Not on file     Attends Jain service: Not on file     Active member of club or organization: Not on file     Attends meetings of clubs or organizations: Not on file     Relationship status: Not on file    Intimate partner violence     Fear of current or ex partner: Not on file     Emotionally abused: Not on file     Physically abused: Not on file     Forced sexual activity: Not on file   Other Topics Concern    Not on file   Social History Narrative    Not on file       History reviewed. No pertinent family history. Allergies:  Patient has no known allergies. Home Medications:  Prior to Admission medications    Medication Sig Start Date End Date Taking? Authorizing Provider   lidocaine 4 % external patch Place 1 patch onto the skin daily 2/3/21 3/5/21 Yes Kimmie Machado DO   ibuprofen (IBU) 800 MG tablet Take 1 tablet by mouth every 6 hours as needed for Pain 2/3/21  Yes Kimmie Machado DO   traZODone (DESYREL) 50 MG tablet Take 1 tablet by mouth nightly as needed for Sleep 8/3/20   Chantel Rangel MD   brexpiprazole (REXULTI) 1 MG TABS tablet Take 1 tablet by mouth nightly 8/3/20   Chantel Rangel MD       REVIEW OFSYSTEMS    (2-9 systems for level 4, 10 or more for level 5)      Review of Systems   Constitutional: Negative for chills and fever. Respiratory: Negative for cough, chest tightness, shortness of breath and wheezing. Cardiovascular: Negative for chest pain, palpitations and leg swelling. Gastrointestinal: Negative for abdominal pain, nausea and vomiting. Musculoskeletal: Negative for back pain and neck pain. Left-sided rib pain. Skin: Negative for rash and wound.    Neurological: Negative for syncope, weakness, light-headedness and numbness. Hematological: Does not bruise/bleed easily. PHYSICAL EXAM   (up to 7 for level 4, 8 or more forlevel 5)      INITIAL VITALS:   Vitals:    02/03/21 1341 02/03/21 1405   BP:  124/87   Pulse:  78   Resp:  16   Temp: 98.3 °F (36.8 °C)    TempSrc: Oral    SpO2:  99%   Weight:  193 lb (87.5 kg)   Height:  5' 7\" (1.702 m)           Physical Exam  Vitals signs and nursing note reviewed. Constitutional:       General: He is not in acute distress. Appearance: Normal appearance. He is not ill-appearing, toxic-appearing or diaphoretic. Cardiovascular:      Rate and Rhythm: Normal rate and regular rhythm. Heart sounds: No murmur. No gallop. Pulmonary:      Effort: Pulmonary effort is normal. No respiratory distress. Breath sounds: Normal breath sounds. No stridor. No wheezing, rhonchi or rales. Abdominal:      General: There is no distension. Palpations: Abdomen is soft. Tenderness: There is no abdominal tenderness. There is no guarding. Musculoskeletal: Normal range of motion. Right lower leg: No edema. Left lower leg: No edema. Comments: Focal point tenderness over the mid left lateral ribs that reproduces the patient's pain. No calf or popliteal tenderness. Skin:     General: Skin is warm and dry. Findings: No rash. Neurological:      Mental Status: He is alert.          DIFFERENTIAL  DIAGNOSIS     PLAN (LABS / IMAGING / EKG):  Orders Placed This Encounter   Procedures    XR CHEST (2 VW)    EKG 12 Lead       MEDICATIONS ORDERED:  Orders Placed This Encounter   Medications    ketorolac (TORADOL) injection 30 mg    lidocaine 4 % external patch 1 patch    lidocaine 4 % external patch     Sig: Place 1 patch onto the skin daily     Dispense:  30 patch     Refill:  0    ibuprofen (IBU) 800 MG tablet     Sig: Take 1 tablet by mouth every 6 hours as needed for Pain     Dispense:  30 tablet     Refill:  0       DDX: Rib fracture, bruised rib, For 30 days, Disp-30 patch, R-0, Print      ibuprofen (IBU) 800 MG tablet Take 1 tablet by mouth every 6 hours as needed for Pain, Disp-30 tablet, R-0Print             Margaret Patel DO  Emergency Medicine Resident    (Please note that portions of this note were completed with a voice recognition program.Efforts were made to edit the dictations but occasionally words are mis-transcribed.)       Rosana Zepeda DO  Resident  02/03/21 2059

## 2021-02-04 LAB
EKG ATRIAL RATE: 86 BPM
EKG P AXIS: 62 DEGREES
EKG P-R INTERVAL: 140 MS
EKG Q-T INTERVAL: 370 MS
EKG QRS DURATION: 84 MS
EKG QTC CALCULATION (BAZETT): 442 MS
EKG R AXIS: 43 DEGREES
EKG T AXIS: 53 DEGREES
EKG VENTRICULAR RATE: 86 BPM

## 2021-02-04 PROCEDURE — 93010 ELECTROCARDIOGRAM REPORT: CPT | Performed by: INTERNAL MEDICINE

## 2021-06-26 ENCOUNTER — APPOINTMENT (OUTPATIENT)
Dept: CT IMAGING | Age: 26
End: 2021-06-26
Payer: MEDICARE

## 2021-06-26 ENCOUNTER — APPOINTMENT (OUTPATIENT)
Dept: GENERAL RADIOLOGY | Age: 26
End: 2021-06-26
Payer: MEDICARE

## 2021-06-26 ENCOUNTER — HOSPITAL ENCOUNTER (EMERGENCY)
Age: 26
Discharge: HOME | End: 2021-06-27
Attending: EMERGENCY MEDICINE
Payer: MEDICARE

## 2021-06-26 DIAGNOSIS — S02.2XXA CLOSED FRACTURE OF NASAL BONE, INITIAL ENCOUNTER: Primary | ICD-10-CM
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
## 2021-06-26 DIAGNOSIS — F10.920 ACUTE ALCOHOLIC INTOXICATION WITHOUT COMPLICATION (HCC): ICD-10-CM

## 2021-06-26 LAB
ABO/RH: NORMAL
ALLEN TEST: ABNORMAL
ANION GAP SERPL CALCULATED.3IONS-SCNC: 17 MMOL/L (ref 9–17)
ANTIBODY SCREEN: NEGATIVE
ARM BAND NUMBER: NORMAL
BLOOD BANK SPECIMEN: ABNORMAL
BUN BLDV-MCNC: 10 MG/DL (ref 6–20)
CARBOXYHEMOGLOBIN: 5.7 % (ref 0–5)
CHLORIDE BLD-SCNC: 108 MMOL/L (ref 98–107)
CO2: 17 MMOL/L (ref 20–31)
CREAT SERPL-MCNC: 1.2 MG/DL (ref 0.7–1.2)
ETHANOL PERCENT: 0.3 %
ETHANOL: 301 MG/DL
EXPIRATION DATE: NORMAL
FIO2: ABNORMAL
GFR AFRICAN AMERICAN: ABNORMAL ML/MIN
GFR NON-AFRICAN AMERICAN: ABNORMAL ML/MIN
GFR SERPL CREATININE-BSD FRML MDRD: ABNORMAL ML/MIN/{1.73_M2}
GFR SERPL CREATININE-BSD FRML MDRD: ABNORMAL ML/MIN/{1.73_M2}
GLUCOSE BLD-MCNC: 119 MG/DL (ref 70–99)
HCG QUALITATIVE: ABNORMAL
HCO3 VENOUS: 18.7 MMOL/L (ref 24–30)
HCT VFR BLD CALC: 47.4 % (ref 40.7–50.3)
HEMOGLOBIN: 15.1 G/DL (ref 13–17)
INR BLD: 0.9
MCH RBC QN AUTO: 30 PG (ref 25.2–33.5)
MCHC RBC AUTO-ENTMCNC: 31.9 G/DL (ref 28.4–34.8)
MCV RBC AUTO: 94 FL (ref 82.6–102.9)
METHEMOGLOBIN: ABNORMAL % (ref 0–1.5)
MODE: ABNORMAL
NEGATIVE BASE EXCESS, VEN: 4 MMOL/L (ref 0–2)
NOTIFICATION TIME: ABNORMAL
NOTIFICATION: ABNORMAL
NRBC AUTOMATED: 0 PER 100 WBC
O2 DEVICE/FLOW/%: ABNORMAL
O2 SAT, VEN: 98.8 % (ref 60–85)
OXYHEMOGLOBIN: ABNORMAL % (ref 95–98)
PARTIAL THROMBOPLASTIN TIME: 21.6 SEC (ref 20.5–30.5)
PATIENT TEMP: 37
PCO2, VEN, TEMP ADJ: ABNORMAL MMHG (ref 39–55)
PCO2, VEN: 29.7 (ref 39–55)
PDW BLD-RTO: 13.5 % (ref 11.8–14.4)
PEEP/CPAP: ABNORMAL
PH VENOUS: 7.42 (ref 7.32–7.42)
PH, VEN, TEMP ADJ: ABNORMAL (ref 7.32–7.42)
PLATELET # BLD: 263 K/UL (ref 138–453)
PMV BLD AUTO: 9.9 FL (ref 8.1–13.5)
PO2, VEN, TEMP ADJ: ABNORMAL MMHG (ref 30–50)
PO2, VEN: 122 (ref 30–50)
POSITIVE BASE EXCESS, VEN: ABNORMAL MMOL/L (ref 0–2)
POTASSIUM SERPL-SCNC: 3.5 MMOL/L (ref 3.7–5.3)
PROTHROMBIN TIME: 9.9 SEC (ref 9.1–12.3)
PSV: ABNORMAL
PT. POSITION: ABNORMAL
RBC # BLD: 5.04 M/UL (ref 4.21–5.77)
RESPIRATORY RATE: ABNORMAL
SAMPLE SITE: ABNORMAL
SET RATE: ABNORMAL
SODIUM BLD-SCNC: 142 MMOL/L (ref 135–144)
TEXT FOR RESPIRATORY: ABNORMAL
TOTAL HB: ABNORMAL G/DL (ref 12–16)
TOTAL RATE: ABNORMAL
VT: ABNORMAL
WBC # BLD: 6.1 K/UL (ref 3.5–11.3)

## 2021-06-26 PROCEDURE — 86900 BLOOD TYPING SEROLOGIC ABO: CPT

## 2021-06-26 PROCEDURE — 85730 THROMBOPLASTIN TIME PARTIAL: CPT

## 2021-06-26 PROCEDURE — 84520 ASSAY OF UREA NITROGEN: CPT

## 2021-06-26 PROCEDURE — 82565 ASSAY OF CREATININE: CPT

## 2021-06-26 PROCEDURE — 82805 BLOOD GASES W/O2 SATURATION: CPT

## 2021-06-26 PROCEDURE — 70450 CT HEAD/BRAIN W/O DYE: CPT

## 2021-06-26 PROCEDURE — 3209999900 CT LUMBAR SPINE TRAUMA RECONSTRUCTION

## 2021-06-26 PROCEDURE — 72125 CT NECK SPINE W/O DYE: CPT

## 2021-06-26 PROCEDURE — 82947 ASSAY GLUCOSE BLOOD QUANT: CPT

## 2021-06-26 PROCEDURE — 80051 ELECTROLYTE PANEL: CPT

## 2021-06-26 PROCEDURE — G0480 DRUG TEST DEF 1-7 CLASSES: HCPCS

## 2021-06-26 PROCEDURE — 84703 CHORIONIC GONADOTROPIN ASSAY: CPT

## 2021-06-26 PROCEDURE — 73030 X-RAY EXAM OF SHOULDER: CPT

## 2021-06-26 PROCEDURE — 85027 COMPLETE CBC AUTOMATED: CPT

## 2021-06-26 PROCEDURE — 85610 PROTHROMBIN TIME: CPT

## 2021-06-26 PROCEDURE — 3209999900 CT THORACIC SPINE TRAUMA RECONSTRUCTION

## 2021-06-26 PROCEDURE — 71260 CT THORAX DX C+: CPT

## 2021-06-26 PROCEDURE — 6360000004 HC RX CONTRAST MEDICATION: Performed by: STUDENT IN AN ORGANIZED HEALTH CARE EDUCATION/TRAINING PROGRAM

## 2021-06-26 PROCEDURE — 86850 RBC ANTIBODY SCREEN: CPT

## 2021-06-26 PROCEDURE — 86901 BLOOD TYPING SEROLOGIC RH(D): CPT

## 2021-06-26 PROCEDURE — 99283 EMERGENCY DEPT VISIT LOW MDM: CPT

## 2021-06-26 RX ORDER — IBUPROFEN 800 MG/1
800 TABLET ORAL EVERY 6 HOURS PRN
Qty: 21 TABLET | Refills: 0 | Status: SHIPPED | OUTPATIENT
Start: 2021-06-26

## 2021-06-26 RX ADMIN — IOPAMIDOL 130 ML: 755 INJECTION, SOLUTION INTRAVENOUS at 17:15
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
## 2021-06-26 NOTE — ED NOTES
Bed: 19  Expected date:   Expected time:   Means of arrival:   Comments:     Irina Chilel RN  06/26/21 2581
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
## 2021-06-27 ASSESSMENT — ENCOUNTER SYMPTOMS
RHINORRHEA: 0
EYE ITCHING: 0
EYE PAIN: 1
SINUS PAIN: 1
SINUS PRESSURE: 0
NAUSEA: 0
EYE REDNESS: 0
CHEST TIGHTNESS: 0
VOMITING: 0
COUGH: 0
SHORTNESS OF BREATH: 0
ABDOMINAL PAIN: 0
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
## 2021-07-02 ENCOUNTER — APPOINTMENT (OUTPATIENT)
Dept: CT IMAGING | Age: 26
End: 2021-07-02
Payer: MEDICARE

## 2021-07-02 ENCOUNTER — APPOINTMENT (OUTPATIENT)
Dept: GENERAL RADIOLOGY | Age: 26
End: 2021-07-02
Payer: MEDICARE

## 2021-07-02 ENCOUNTER — HOSPITAL ENCOUNTER (EMERGENCY)
Age: 26
Discharge: HOME | End: 2021-07-03
Attending: SURGERY
Payer: MEDICARE

## 2021-07-02 VITALS
TEMPERATURE: 98.1 F | DIASTOLIC BLOOD PRESSURE: 59 MMHG | OXYGEN SATURATION: 96 % | RESPIRATION RATE: 13 BRPM | SYSTOLIC BLOOD PRESSURE: 108 MMHG | HEART RATE: 78 BPM
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
## 2021-07-02 LAB
-: NORMAL
ABO/RH: NORMAL
ALLEN TEST: ABNORMAL
ANION GAP SERPL CALCULATED.3IONS-SCNC: 10 MMOL/L (ref 9–17)
ANTIBODY SCREEN: NEGATIVE
ARM BAND NUMBER: NORMAL
BLOOD BANK SPECIMEN: ABNORMAL
BUN BLDV-MCNC: 9 MG/DL (ref 6–20)
CARBOXYHEMOGLOBIN: 6.3 % (ref 0–5)
CHLORIDE BLD-SCNC: 103 MMOL/L (ref 98–107)
CO2: 22 MMOL/L (ref 20–31)
CREAT SERPL-MCNC: 1.04 MG/DL (ref 0.7–1.2)
ETHANOL PERCENT: 0.26 %
ETHANOL: 262 MG/DL
EXPIRATION DATE: NORMAL
FIO2: ABNORMAL
GFR AFRICAN AMERICAN: ABNORMAL ML/MIN
GFR NON-AFRICAN AMERICAN: ABNORMAL ML/MIN
GFR SERPL CREATININE-BSD FRML MDRD: ABNORMAL ML/MIN/{1.73_M2}
GFR SERPL CREATININE-BSD FRML MDRD: ABNORMAL ML/MIN/{1.73_M2}
GLUCOSE BLD-MCNC: 84 MG/DL (ref 70–99)
HCG QUALITATIVE: ABNORMAL
HCO3 VENOUS: 21.6 MMOL/L (ref 24–30)
HCT VFR BLD CALC: 43.2 % (ref 40.7–50.3)
HEMOGLOBIN: 13.9 G/DL (ref 13–17)
INR BLD: ABNORMAL
MCH RBC QN AUTO: 29.9 PG (ref 25.2–33.5)
MCHC RBC AUTO-ENTMCNC: 32.2 G/DL (ref 28.4–34.8)
MCV RBC AUTO: 92.9 FL (ref 82.6–102.9)
METHEMOGLOBIN: ABNORMAL % (ref 0–1.5)
MODE: ABNORMAL
NEGATIVE BASE EXCESS, VEN: 1.4 MMOL/L (ref 0–2)
NOTIFICATION TIME: ABNORMAL
NOTIFICATION: ABNORMAL
NRBC AUTOMATED: 0 PER 100 WBC
O2 DEVICE/FLOW/%: ABNORMAL
O2 SAT, VEN: 98.7 % (ref 60–85)
OXYHEMOGLOBIN: ABNORMAL % (ref 95–98)
PARTIAL THROMBOPLASTIN TIME: ABNORMAL SEC
PATIENT TEMP: 37
PCO2, VEN, TEMP ADJ: ABNORMAL MMHG (ref 39–55)
PCO2, VEN: 33 (ref 39–55)
PDW BLD-RTO: 13.7 % (ref 11.8–14.4)
PEEP/CPAP: ABNORMAL
PH VENOUS: 7.43 (ref 7.32–7.42)
PH, VEN, TEMP ADJ: ABNORMAL (ref 7.32–7.42)
PLATELET # BLD: 212 K/UL (ref 138–453)
PMV BLD AUTO: 10.5 FL (ref 8.1–13.5)
PO2, VEN, TEMP ADJ: ABNORMAL MMHG (ref 30–50)
PO2, VEN: 117 (ref 30–50)
POSITIVE BASE EXCESS, VEN: ABNORMAL MMOL/L (ref 0–2)
POTASSIUM SERPL-SCNC: 3.7 MMOL/L (ref 3.7–5.3)
PROTHROMBIN TIME: ABNORMAL SEC
PSV: ABNORMAL
PT. POSITION: ABNORMAL
RBC # BLD: 4.65 M/UL (ref 4.21–5.77)
REASON FOR REJECTION: NORMAL
RESPIRATORY RATE: ABNORMAL
SAMPLE SITE: ABNORMAL
SARS-COV-2, RAPID: NOT DETECTED
SET RATE: ABNORMAL
SODIUM BLD-SCNC: 135 MMOL/L (ref 135–144)
SPECIMEN DESCRIPTION: NORMAL
TEXT FOR RESPIRATORY: ABNORMAL
TOTAL HB: ABNORMAL G/DL (ref 12–16)
TOTAL RATE: ABNORMAL
VT: ABNORMAL
WBC # BLD: 5.3 K/UL (ref 3.5–11.3)
ZZ NTE CLEAN UP: ORDERED TEST: NORMAL
ZZ NTE WITH NAME CLEAN UP: SPECIMEN SOURCE: NORMAL

## 2021-07-02 PROCEDURE — 85027 COMPLETE CBC AUTOMATED: CPT

## 2021-07-02 PROCEDURE — 72125 CT NECK SPINE W/O DYE: CPT

## 2021-07-02 PROCEDURE — 86901 BLOOD TYPING SEROLOGIC RH(D): CPT

## 2021-07-02 PROCEDURE — 82805 BLOOD GASES W/O2 SATURATION: CPT

## 2021-07-02 PROCEDURE — 86900 BLOOD TYPING SEROLOGIC ABO: CPT

## 2021-07-02 PROCEDURE — 85610 PROTHROMBIN TIME: CPT

## 2021-07-02 PROCEDURE — 3209999900 CT LUMBAR SPINE TRAUMA RECONSTRUCTION

## 2021-07-02 PROCEDURE — G0480 DRUG TEST DEF 1-7 CLASSES: HCPCS

## 2021-07-02 PROCEDURE — 85730 THROMBOPLASTIN TIME PARTIAL: CPT

## 2021-07-02 PROCEDURE — 70450 CT HEAD/BRAIN W/O DYE: CPT

## 2021-07-02 PROCEDURE — 99283 EMERGENCY DEPT VISIT LOW MDM: CPT

## 2021-07-02 PROCEDURE — 80051 ELECTROLYTE PANEL: CPT

## 2021-07-02 PROCEDURE — 86850 RBC ANTIBODY SCREEN: CPT

## 2021-07-02 PROCEDURE — 3209999900 CT THORACIC SPINE TRAUMA RECONSTRUCTION

## 2021-07-02 PROCEDURE — 84703 CHORIONIC GONADOTROPIN ASSAY: CPT

## 2021-07-02 PROCEDURE — 82565 ASSAY OF CREATININE: CPT

## 2021-07-02 PROCEDURE — 71260 CT THORAX DX C+: CPT

## 2021-07-02 PROCEDURE — 82947 ASSAY GLUCOSE BLOOD QUANT: CPT

## 2021-07-02 PROCEDURE — 84520 ASSAY OF UREA NITROGEN: CPT

## 2021-07-02 PROCEDURE — 6360000004 HC RX CONTRAST MEDICATION: Performed by: STUDENT IN AN ORGANIZED HEALTH CARE EDUCATION/TRAINING PROGRAM

## 2021-07-02 PROCEDURE — 87635 SARS-COV-2 COVID-19 AMP PRB: CPT

## 2021-07-02 RX ORDER — FENTANYL CITRATE 50 UG/ML
INJECTION, SOLUTION INTRAMUSCULAR; INTRAVENOUS
Status: DISCONTINUED
Start: 2021-07-02 | End: 2021-07-03 | Stop reason: HOSPADM

## 2021-07-02 RX ADMIN — IOPAMIDOL 130 ML: 755 INJECTION, SOLUTION INTRAVENOUS at 19:23
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
## 2021-07-02 NOTE — ED NOTES
Bed: 27  Expected date:   Expected time:   Means of arrival:   Comments:  [address], RN  07/02/21 3366
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
## 2021-07-03 ASSESSMENT — ENCOUNTER SYMPTOMS
ABDOMINAL PAIN: 1
BACK PAIN: 1
WHEEZING: 0
COUGH: 0
SHORTNESS OF BREATH: 0
VOMITING: 0
NAUSEA: 0
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
## 2021-07-03 NOTE — ED NOTES
Pt resting in bed. Eyes closed VSS.  tpd and sitter at bedside for suicide christal Mireles RN  07/02/21 [id number]
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
## 2021-07-03 NOTE — ED NOTES
Pt ambulated to safer area with [facility] police per dr Cole Peak request. Pt A&Ox4.        Destinee Newman RN  07/02/21 1707

## 2022-03-03 NOTE — GROUP NOTE
Daily Note     Today's date: 3/3/2022  Patient name: Vanda Campo  : 1971  MRN: [id number]  Referring provider: Roberto Ludwig MD  Dx:   Encounter Diagnosis     ICD-10-CM    1  Chronic right shoulder pain  M25 511     G89 29        Start Time: 930          Subjective: pt reports no new complaints about his R shoulder  Objective: See treatment diary below      Assessment: Tolerated treatment well  Patient would benefit from continued PT  Good ROM in shoulder  Increased resistance w/ shoulder strengthening and scap stabilization  Plan: Continue per plan of care        Diagnosis: R shoulder pain   Precautions: -   Primary Goals: Return to normal ADLs/work duties without pain or limitation   POC Expires: 3/8/22   Re-evaluation Date: 3/8/22   FOTO Scores/Date: Goal 60; 22 - 62   Visit Count 610 7/10 8/10 9/10    Manuals 2/10 2/22 2/24 3/3    R shoulder PROM all planes per tolerance  [facility]                                    Ther Ex        UBE 4/4 4// 4/4    Supine wand flexion close         Wall slides flexion W/lift 5" 10x  W/lift 5" 10x  W/lift 5" 15x W/lift 5" 15x    Wall slides abduction W/lift 5" x10 W/lift 5" x10 W/lift 5" 15x W/lift 5" 15x    Supine ER        Supine flexion 2# 2x10  2# 2x10 3# 2x10    S/l ER 2# 2x10  2# 2x10 3# 2x10    S/l abduction 2# 4x5  2# 2x10 3# 2x10    Waiters carry         Supine cane flexion Close/narrow  10"x10 ea        Neuro Re-Ed        Prone I, T, rows        Prone scap retraction        Mid rows  Orange 20x      SA rows  grn 20x      steamboats Yellow x15 ea dir  grn 20x ea dir      Roll ball up wall 3# x10 3# 10x 5# 10x     Serratus punch supine  Standing into ball on wall 20x Supine 2# 2x10 Supine 3# 2x10    Ball on wall  2# cw/ccw 20x 2# cw/ccw 20x  2# cw/ccw 20x     Wall clocks        D2 flexion supine   ytb 2x10 ytb 2x10                              Ther Act                            Modalities Group Therapy Note    Date: 8/1/2020    Group Start Time: 3671  Group End Time: 1465  Group Topic: [address] [place], [place]    Patient refused to attend Goal Setting / Community Meeting Group at 4745 after encouragement from staff. 1:1 talk time offered.     Signature:  Rose Appiah

## 2025-02-12 ENCOUNTER — HOSPITAL ENCOUNTER (EMERGENCY)
Age: 30
Discharge: HOME | End: 2025-02-13
Attending: EMERGENCY MEDICINE
Payer: COMMERCIAL

## 2025-02-12 DIAGNOSIS — R45.850 HOMICIDAL THOUGHTS: ICD-10-CM

## 2025-02-12 DIAGNOSIS — R44.0 AUDITORY HALLUCINATIONS: ICD-10-CM

## 2025-02-12 DIAGNOSIS — R45.851 SUICIDAL THOUGHTS: Primary | ICD-10-CM

## 2025-02-12 LAB
AMPHET UR QL SCN: NEGATIVE
ANION GAP SERPL CALCULATED.3IONS-SCNC: 16 MMOL/L (ref 9–16)
APAP SERPL-MCNC: <5 UG/ML (ref 10–30)
BARBITURATES UR QL SCN: NEGATIVE
BASOPHILS # BLD: 0.04 K/UL (ref 0–0.2)
BASOPHILS NFR BLD: 1 % (ref 0–2)
BENZODIAZ UR QL: NEGATIVE
BUN SERPL-MCNC: 17 MG/DL (ref 6–20)
CALCIUM SERPL-MCNC: 9.5 MG/DL (ref 8.6–10.4)
CANNABINOIDS UR QL SCN: NEGATIVE
CHLORIDE SERPL-SCNC: 109 MMOL/L (ref 98–107)
CO2 SERPL-SCNC: 19 MMOL/L (ref 20–31)
COCAINE UR QL SCN: NEGATIVE
CREAT SERPL-MCNC: 1.2 MG/DL (ref 0.7–1.2)
EOSINOPHIL # BLD: 0.03 K/UL (ref 0–0.44)
EOSINOPHILS RELATIVE PERCENT: 0 % (ref 1–4)
ERYTHROCYTE [DISTWIDTH] IN BLOOD BY AUTOMATED COUNT: 12.9 % (ref 11.8–14.4)
ETHANOL PERCENT: 0.26 %
ETHANOLAMINE SERPL-MCNC: 258 MG/DL (ref 0–0.08)
FENTANYL UR QL: NEGATIVE
GFR, ESTIMATED: 83 ML/MIN/1.73M2
GLUCOSE SERPL-MCNC: 95 MG/DL (ref 74–99)
HCT VFR BLD AUTO: 44.1 % (ref 40.7–50.3)
HGB BLD-MCNC: 14.5 G/DL (ref 13–17)
IMM GRANULOCYTES # BLD AUTO: 0.03 K/UL (ref 0–0.3)
IMM GRANULOCYTES NFR BLD: 0 %
LYMPHOCYTES NFR BLD: 2.28 K/UL (ref 1.1–3.7)
LYMPHOCYTES RELATIVE PERCENT: 26 % (ref 24–43)
MCH RBC QN AUTO: 29.2 PG (ref 25.2–33.5)
MCHC RBC AUTO-ENTMCNC: 32.9 G/DL (ref 28.4–34.8)
MCV RBC AUTO: 88.7 FL (ref 82.6–102.9)
METHADONE UR QL: NEGATIVE
MONOCYTES NFR BLD: 0.49 K/UL (ref 0.1–1.2)
MONOCYTES NFR BLD: 6 % (ref 3–12)
NEUTROPHILS NFR BLD: 67 % (ref 36–65)
NEUTS SEG NFR BLD: 5.91 K/UL (ref 1.5–8.1)
NRBC BLD-RTO: 0 PER 100 WBC
OPIATES UR QL SCN: NEGATIVE
OXYCODONE UR QL SCN: NEGATIVE
PCP UR QL SCN: NEGATIVE
PLATELET # BLD AUTO: 254 K/UL (ref 138–453)
PMV BLD AUTO: 10.2 FL (ref 8.1–13.5)
POTASSIUM SERPL-SCNC: 3.4 MMOL/L (ref 3.7–5.3)
RBC # BLD AUTO: 4.97 M/UL (ref 4.21–5.77)
SALICYLATES SERPL-MCNC: <0.5 MG/DL (ref 0–10)
SODIUM SERPL-SCNC: 144 MMOL/L (ref 136–145)
TEST INFORMATION: NORMAL
WBC OTHER # BLD: 8.8 K/UL (ref 3.5–11.3)

## 2025-02-12 PROCEDURE — 96372 THER/PROPH/DIAG INJ SC/IM: CPT

## 2025-02-12 PROCEDURE — 80179 DRUG ASSAY SALICYLATE: CPT

## 2025-02-12 PROCEDURE — 80048 BASIC METABOLIC PNL TOTAL CA: CPT

## 2025-02-12 PROCEDURE — 85025 COMPLETE CBC W/AUTO DIFF WBC: CPT

## 2025-02-12 PROCEDURE — 99285 EMERGENCY DEPT VISIT HI MDM: CPT

## 2025-02-12 PROCEDURE — G0480 DRUG TEST DEF 1-7 CLASSES: HCPCS

## 2025-02-12 PROCEDURE — 80307 DRUG TEST PRSMV CHEM ANLYZR: CPT

## 2025-02-12 PROCEDURE — 80143 DRUG ASSAY ACETAMINOPHEN: CPT

## 2025-02-12 PROCEDURE — 6360000002 HC RX W HCPCS

## 2025-02-12 RX ORDER — LORAZEPAM 2 MG/ML
2 INJECTION INTRAMUSCULAR ONCE
Status: COMPLETED | OUTPATIENT
Start: 2025-02-12 | End: 2025-02-12

## 2025-02-12 RX ORDER — DIPHENHYDRAMINE HYDROCHLORIDE 50 MG/ML
INJECTION INTRAMUSCULAR; INTRAVENOUS
Status: DISCONTINUED
Start: 2025-02-12 | End: 2025-02-12 | Stop reason: WASHOUT

## 2025-02-12 RX ORDER — LORAZEPAM 2 MG/ML
INJECTION INTRAMUSCULAR
Status: COMPLETED
Start: 2025-02-12 | End: 2025-02-12

## 2025-02-12 RX ORDER — DIPHENHYDRAMINE HYDROCHLORIDE 50 MG/ML
INJECTION INTRAMUSCULAR; INTRAVENOUS
Status: COMPLETED
Start: 2025-02-12 | End: 2025-02-12

## 2025-02-12 RX ORDER — HALOPERIDOL 5 MG/ML
INJECTION INTRAMUSCULAR
Status: COMPLETED
Start: 2025-02-12 | End: 2025-02-12

## 2025-02-12 RX ORDER — HALOPERIDOL 5 MG/ML
INJECTION INTRAMUSCULAR
Status: DISCONTINUED
Start: 2025-02-12 | End: 2025-02-12 | Stop reason: WASHOUT

## 2025-02-12 RX ORDER — DIPHENHYDRAMINE HYDROCHLORIDE 50 MG/ML
50 INJECTION INTRAMUSCULAR; INTRAVENOUS ONCE
Status: COMPLETED | OUTPATIENT
Start: 2025-02-12 | End: 2025-02-12

## 2025-02-12 RX ORDER — HALOPERIDOL 5 MG/ML
5 INJECTION INTRAMUSCULAR ONCE
Status: COMPLETED | OUTPATIENT
Start: 2025-02-12 | End: 2025-02-12

## 2025-02-12 RX ADMIN — HALOPERIDOL LACTATE 5 MG: 5 INJECTION, SOLUTION INTRAMUSCULAR at 22:10

## 2025-02-12 RX ADMIN — DIPHENHYDRAMINE HYDROCHLORIDE 50 MG: 50 INJECTION INTRAMUSCULAR; INTRAVENOUS at 22:10

## 2025-02-12 RX ADMIN — LORAZEPAM 2 MG: 2 INJECTION INTRAMUSCULAR; INTRAVENOUS at 22:10

## 2025-02-12 RX ADMIN — LORAZEPAM 2 MG: 2 INJECTION INTRAMUSCULAR at 22:10

## 2025-02-12 RX ADMIN — HALOPERIDOL 5 MG: 5 INJECTION INTRAMUSCULAR at 22:10

## 2025-02-13 VITALS
SYSTOLIC BLOOD PRESSURE: 108 MMHG | OXYGEN SATURATION: 92 % | RESPIRATION RATE: 20 BRPM | HEART RATE: 100 BPM | DIASTOLIC BLOOD PRESSURE: 47 MMHG | TEMPERATURE: 97.6 F

## 2025-02-13 PROCEDURE — 2580000003 HC RX 258: Performed by: STUDENT IN AN ORGANIZED HEALTH CARE EDUCATION/TRAINING PROGRAM

## 2025-02-13 RX ORDER — 0.9 % SODIUM CHLORIDE 0.9 %
1000 INTRAVENOUS SOLUTION INTRAVENOUS ONCE
Status: COMPLETED | OUTPATIENT
Start: 2025-02-13 | End: 2025-02-13

## 2025-02-13 RX ADMIN — SODIUM CHLORIDE 1000 ML: 9 INJECTION, SOLUTION INTRAVENOUS at 01:22

## 2025-02-13 NOTE — ED PROVIDER NOTES
Faculty Sign-Out Attestation  Handoff taken on the following patient from prior Attending Physician: Annalee  Note Started: 12:03 AM EST    I was available and discussed any additional care issues that arose and coordinated the management plans with the resident(s) caring for the patient during my duty period. Any areas of disagreement with resident’s documentation of care or procedures are noted on the chart. I was personally present for the key portions of any/all procedures during my duty period. I have documented in the chart those procedures where I was not present during the key portions.    Etoh 250 / B52, suicidal,   Sober at 0530 > needing recheck     --- sober recheck not suicidal, talkative, ambulatory, discharged  Miquel Stevenson DO  Attending Physician       Miquel Stevenson DO  02/13/25 0004       Miquel Stevenson DO  02/13/25 0708

## 2025-02-13 NOTE — ED PROVIDER NOTES
[facility] EMERGENCY DEPARTMENT     Emergency Department     Faculty Attestation        I performed a history and physical examination of the patient and discussed management with the resident. I reviewed the resident’s note and agree with the documented findings and plan of care. Any areas of disagreement are noted on the chart. I was personally present for the key portions of any procedures. I have documented in the chart those procedures where I was not present during the key portions. I have reviewed the emergency nurses triage note. I agree with the chief complaint, past medical history, past surgical history, allergies, medications, social and family history as documented unless otherwise noted below.    For mid-level providers such as nurse practitioners as well as physicians assistants:    I have personally seen and evaluated the patient.    I find the patient's history and physical exam are consistent with NP/PA documentation.  I agree with the care provided, treatment rendered, disposition, & follow-up plan.     Additional findings are as noted.    Vital Signs: There were no vitals taken for this visit.  PCP:  No primary care provider on file.    Pertinent Comments:     Patient was dropped off in the waiting room by [employer].  The reason that he was dropped off was not communicated unfortunately by [employer].  Patient appears clinically intoxicated he did voice to the resident that he is hearing voices to tell him to hurt other people himself.  Patient required chemical sedation will obtain [facility] laboratory studies, reassessment      Critical Care  None          Xavi Mantilla MD    Attending Emergency Medicine Physician            Chevy Mantilla MD  02/12/25 5428

## 2025-02-13 NOTE — DISCHARGE INSTRUCTIONS
Seen in the emergency department for suicidal thoughts, homicidal thoughts, and auditory hallucinations.  You arrived intoxicated and were aggressive with staff.  You required sedation due to aggression.  You are found to be intoxicated.  Your sober time was 5 AM.  You were reassessed at 5 AM and you were adamant that you were not suicidal, not homicidal.  Please return to the emergency department if you develop any further suicidal, homicidal thoughts or hallucinations.  Do your best to practice safe alcohol consumption habits.  Please follow-up with your primary care within the next 1 to 2 days.

## 2025-02-13 NOTE — ED NOTES
[] [facility]    [] [facility]    [x]  [facility]    SUICIDE RISK ASSESSMENT      Y  N     [x] [] In the past two weeks have you had thoughts of hurting yourself in any way?    [x] [] In the past two weeks have you had thoughts that you would be better off dead?   [] [x] Have you made a suicide attempt in the past two months?   [x] [] Do you have a plan for hurting yourself or suicide?   [x] [] Presence of hallucinations/voices related to hurting himself or herself or someone else.    SUICIDE/SECURITY WATCH PRECAUTION CHECKLIST     Orders    [x]  Suicide/Security Watch Precautions initiated as checked below:   2/12/25 10:42 PM EST 09/09    [x] Notified physician:  Chevy Mantilla MD  2/12/25 10:42 PM EST    [x] Orders obtained as appropriate:     [x] 1:1 Observer     [] Psych Consult     [] Psych Consult    Name:  Date:  Time:    [x] 1:1 Observer, Notified by:  Savannah Duane, RN    Contact Nurse Supervisor    [x] Remove all personal clothes from room and place in snap/paper gown/pants.  Slipper only    [x] Remove all personal belongings from room and secured away from patient.    Documentation    [x] Initiate Suicide/Security Watch Precaution Flow Sheet    [x] Initiate individualized Care Plan/Problem    [x] Document why precautions initiated on flow sheet (Initiate Nursing Care Plan/Problem)    [x] 1:1 Observer in place; instructions provided.  Suicide precautions require observer be within arms length.    [x] Nurse-Observer Communication Hand-off initiated by RN, reviewed with Observer.  Subsequently used as Hand Off between Observers.    [x] Initiate every 15 minute observations per observer as delegated by the RN.    [x] Initiate RN assessment and documentation    Environmental Scan  Search Criteria and Process: OPTIONAL, see Search Policy    [] Reason for search:    [] Nursing in presence of second person to search patient    [] Patient notified of reason for body assessment and  ideation/behavior  [] Depression  [] Suicide attempt      [] Low self-esteem  [] Hallucinations      [] Feeling of Hopelessness  [] Substance abuse or withdrawal    [] Dysfunctional family  [] Major traumatic event, eg., divorce, etc   [] Excessive stress/anxiety    2/12/25    Expected Outcomes    Patient will:   [x] Patient will remain safe for the duration of their stay   [x] Patient's environment will be safe, eg. Free of potential suicide weapons   [] Verbalize Recovery from suicidal episode and improvement in self-worth   [x] Discuss feeling that precipitated suicide attempt/thoughts/behavior   [] Will describe available resources for crisis prevention and management   [] Will verbalize positive coping skills     Nursing Intervention   [x] Assessment and Observations hourly   [x] Suicide Precautions implemented with patient, should be 1:1 observation   [x] Document observation [id number] and RN assessment hourly   [] Consult physician for:    [] Psychiatric consult    [] Pharmacological therapy    [] Other:    [x] Patient search completed by security   [x] Initiated appropriate safety protocols by removing from the patient's environment anything that could be used to inflict self injury, eg. Order safe tray, snap gown, etc   [x] Maintain open, warm, caring, non-judgmental attitude/manner towards patient   [] Discuss advantages and disadvantages of existing coping methods/skills   [x] Assist and educate patient with identifying present strengths and coping skills   [x] Keep patient informed regarding plan of care and provide clear concise explanations.  Provide the patient/family education information as well as telephone numbers and other information about crisis centers, hot lines, and counselors.    Discharge Planning:   [] Referral  [] Groups [] Health agencies  [] Other:

## 2025-02-13 NOTE — ED PROVIDER NOTES
[facility] EMERGENCY DEPARTMENT  Emergency Department Encounter  Emergency Medicine Resident     Pt Name:Dyllan Lambert  MRN: [id number]  Birthdate 1995  Date of evaluation: 2/12/25  PCP:  No primary care provider on file.  Note Started: 9:50 PM EST      CHIEF COMPLAINT       Chief Complaint   Patient presents with    Suicidal       HISTORY OF PRESENT ILLNESS  (Location/Symptom, Timing/Onset, Context/Setting, Quality, Duration, Modifying Factors, Severity.)      Dyllan Lambert is a 29 y.o. male with previous episodes of suicidal ideation who presents emergency department tonight intoxicated with complaints of suicidal ideation, homicidal ideation, and auditory hallucinations.  Patient became extremely aggressive with staff and was clearly a threat to staff and himself.  Yelling and cussing at staff including hospital police.  Additionally patient became aggressive with me and was explaining that he was hearing voices telling him to harm himself and harm others.  Ultimately, patient required B-52 in order to control the situation.  Social work aware.  Obtaining [facility] labs.  No outward signs of trauma.  Patient ambulated in the emergency department unassisted without difficulty.  No increased work of breathing or conversational dyspnea during the encounter.  Initially, patient arrival he was agreeable to auscultation of heart and lungs and abdomen    PAST MEDICAL / SURGICAL / SOCIAL / FAMILY HISTORY      has no past medical history on file.     has no past surgical history on file.      Social History     Socioeconomic History    Marital status: Single     Spouse name: Not on file    Number of children: Not on file    Years of education: Not on file    Highest education level: Not on file   Occupational History    Not on file   Tobacco Use    Smoking status: Former    Smokeless tobacco: Never   Substance and Sexual Activity    Alcohol use: Not Currently    Drug use: Yes     Types: Marijuana (Weed)
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
